# Patient Record
Sex: FEMALE | Race: WHITE | NOT HISPANIC OR LATINO | Employment: UNEMPLOYED | URBAN - METROPOLITAN AREA
[De-identification: names, ages, dates, MRNs, and addresses within clinical notes are randomized per-mention and may not be internally consistent; named-entity substitution may affect disease eponyms.]

---
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
## 2018-02-23 ENCOUNTER — OFFICE VISIT (OUTPATIENT)
Dept: INTERNAL MEDICINE | Facility: CLINIC | Age: 18
End: 2018-02-23

## 2018-02-23 VITALS
BODY MASS INDEX: 18.83 KG/M2 | HEART RATE: 75 BPM | WEIGHT: 120 LBS | OXYGEN SATURATION: 99 % | HEIGHT: 67 IN | RESPIRATION RATE: 26 BRPM | DIASTOLIC BLOOD PRESSURE: 70 MMHG | SYSTOLIC BLOOD PRESSURE: 102 MMHG

## 2018-02-23 DIAGNOSIS — R30.0 DYSURIA: ICD-10-CM

## 2018-02-23 DIAGNOSIS — N89.8 VAGINAL DISCHARGE: Primary | ICD-10-CM

## 2018-02-23 DIAGNOSIS — N75.0 BARTHOLIN'S CYST: ICD-10-CM

## 2018-02-23 LAB
BILIRUB BLD-MCNC: NEGATIVE MG/DL
CLARITY, POC: CLEAR
COLOR UR: YELLOW
GLUCOSE UR STRIP-MCNC: NEGATIVE MG/DL
KETONES UR QL: NEGATIVE
LEUKOCYTE EST, POC: ABNORMAL
NITRITE UR-MCNC: NEGATIVE MG/ML
PH UR: 5 [PH] (ref 5–8)
PROT UR STRIP-MCNC: NEGATIVE MG/DL
RBC # UR STRIP: NEGATIVE /UL
SP GR UR: 1.02 (ref 1–1.03)
UROBILINOGEN UR QL: NORMAL

## 2018-02-23 PROCEDURE — 99203 OFFICE O/P NEW LOW 30 MIN: CPT | Performed by: FAMILY MEDICINE

## 2018-02-23 PROCEDURE — 87077 CULTURE AEROBIC IDENTIFY: CPT | Performed by: FAMILY MEDICINE

## 2018-02-23 PROCEDURE — 87086 URINE CULTURE/COLONY COUNT: CPT | Performed by: FAMILY MEDICINE

## 2018-02-23 PROCEDURE — 87186 SC STD MICRODIL/AGAR DIL: CPT | Performed by: FAMILY MEDICINE

## 2018-02-23 NOTE — PATIENT INSTRUCTIONS
It was nice meeting you today!  I look forward to getting to know you and helping you with your primary care needs.  Please sign a consent to request medical records from your previous primary care provider.  We will call you with your lab results when they come back.  We have scheduled an appointment for you to see a gynecologist on 03/06/18 at 11 AM at [facility].   Recommend that you sign up for My Chart (our patient portal).  You will be able to access lab results, request appointments and send our office messages.  If you are interested, please ask for My Chart access information at the check out desk.  Please schedule an appointment for your annual physical exam (if you have not already had one for this year) at your earliest convenience.  Please follow-up as indicated.  Return to the clinic sooner if your symptoms worsen or if you have any other concerns.

## 2018-02-23 NOTE — PROGRESS NOTES
Subjective   Radha Villanueva is a 18 y.o. female who presents to the clinic to establish care and for complaint of Cyst (pelvic area) and Dysuria (burning)      History of Present Illness  She reports that her previous PCP was Afsaneh Spicer MD in [place], [place].  Transferred care due to relocating to [place] in August.  Specialists include: None  Prescription medications include: None  OTC medications include: None    Patient reports complaint of dysuria associated with malodorous urine.  Symptoms initially started in early December and have been persistent.  Was seen at an [facility] in late December and was treated for a yeast infection.  Reports that her dysuria initially improved, but then recurred about one week later.  Currently sexually active with one male partner.  Denies any history of STIs.  Also denies associated fevers, sweats, chills, flank pain, nausea, vomiting, diarrhea, hematuria, vaginal discharge, abnormal vaginal bleeding.    Also reports complaint of a recurrent Bartholin cyst on her right labia.  States that her cyst initially occurred last May and it was drained by her last PCP, which helped.  Reports recurrence of her cyst this past fall and states that it has been getting larger.    Review of Systems   Constitutional: Negative for chills, fatigue and fever.   HENT: Negative for congestion, ear pain, rhinorrhea, sinus pressure and sore throat.    Eyes: Negative for pain and visual disturbance.   Respiratory: Negative for cough and shortness of breath.    Cardiovascular: Negative for chest pain and palpitations.   Gastrointestinal: Negative for abdominal pain, constipation, diarrhea, nausea and vomiting.   Genitourinary: Positive for dysuria. Negative for decreased urine volume, hematuria, vaginal bleeding, vaginal discharge and vaginal pain.        Positive for malodorous urine.   Musculoskeletal: Negative for back pain and gait problem.   Skin: Negative for pallor, rash and wound.         "Positive for cyst on right labia.   Neurological: Negative for dizziness, syncope and headaches.   Psychiatric/Behavioral: The patient is not nervous/anxious.         Negative for depressed mood.     All other systems reviewed and are negative.    Past Medical History:   Diagnosis Date   • Bartholin gland cyst        Family History   Problem Relation Age of Onset   • No Known Problems Mother    • No Known Problems Father    • Cancer Maternal Grandmother      Unsure what kind   • No Known Problems Maternal Grandfather    • No Known Problems Paternal Grandmother    • No Known Problems Paternal Grandfather        Past Surgical History:   Procedure Laterality Date   • TONSILLECTOMY  2006       Social History     Social History   • Marital status:      Spouse name: N/A   • Number of children: N/A   • Years of education: N/A     Occupational History   • Not on file.     Social History Main Topics   • Smoking status: Never Smoker   • Smokeless tobacco: Never Used   • Alcohol use No   • Drug use: No   • Sexual activity: Yes     Partners: Male     Birth control/ protection: None     Other Topics Concern   • Not on file     Social History Narrative   • No narrative on file       No current outpatient prescriptions on file.    Objective   /70  Pulse 75  Resp 26  Ht 170.2 cm (67\")  Wt 54.4 kg (120 lb)  LMP 01/23/2018  SpO2 99%  BMI 18.79 kg/m2     Physical Exam   Constitutional: She is oriented to person, place, and time. She appears well-developed and well-nourished.   No acute distress.   HENT:   Head: Normocephalic and atraumatic.   Right Ear: External ear normal.   Left Ear: External ear normal.   Nose: Nose normal.   Mouth/Throat: Oropharynx is clear and moist.   Eyes: Conjunctivae and EOM are normal.   Neck: Normal range of motion. Neck supple.   Cardiovascular: Normal rate, regular rhythm, normal heart sounds and intact distal pulses.    Pulmonary/Chest: Effort normal and breath sounds normal. No " respiratory distress. She has no wheezes.   Abdominal: Soft. Bowel sounds are normal. There is no tenderness. There is no CVA tenderness.   Genitourinary: Cervix exhibits no motion tenderness, no discharge and no friability. No erythema or bleeding in the vagina. Vaginal discharge found.   Genitourinary Comments: Approximately 2 cm fluctuant mass noted on right labia.   Musculoskeletal: Normal range of motion.   Normal gait.   Neurological: She is alert and oriented to person, place, and time.   Skin: Skin is warm and dry.   Psychiatric: She has a normal mood and affect. Her behavior is normal.   Vitals reviewed.    Pelvic exam was chaperoned by RAFIQ Davila.    Assessment/Plan   Radha was seen today for cyst and dysuria.    Diagnoses and all orders for this visit:    Vaginal discharge    OneSwab testing was done today to test for bacterial vaginosis and yeast infections.  Will treat if lab results indicate.  Advised patient to return to the clinic if their symptoms worsen or persist despite the above.    Dysuria  -     POC Urinalysis Dipstick, Automated  -     Urine Culture - Urine, Urine, Clean Catch    The above labs were ordered today.  Discussed lab results with patient today.  Will plan to treat if urine culture results indicate.  Advised patient to return to the clinic if their symptoms worsen or persist despite the above.    Bartholin's cyst    Recommended that patient establish care with a gynecologist for further evaluation and management.

## 2018-02-25 LAB
BACTERIA SPEC AEROBE CULT: ABNORMAL
BACTERIA SPEC AEROBE CULT: ABNORMAL

## 2018-02-26 DIAGNOSIS — N39.0 ACUTE UTI: Primary | ICD-10-CM

## 2018-02-26 RX ORDER — NITROFURANTOIN 25; 75 MG/1; MG/1
100 CAPSULE ORAL 2 TIMES DAILY
Qty: 14 CAPSULE | Refills: 0 | Status: SHIPPED | OUTPATIENT
Start: 2018-02-26 | End: 2018-03-05

## 2018-11-24 ENCOUNTER — HOSPITAL ENCOUNTER (EMERGENCY)
Facility: HOSPITAL | Age: 18
Discharge: HOME | End: 2018-11-25
Attending: EMERGENCY MEDICINE | Admitting: EMERGENCY MEDICINE

## 2018-11-24 DIAGNOSIS — O20.0 THREATENED MISCARRIAGE IN EARLY PREGNANCY: Primary | ICD-10-CM

## 2018-11-24 LAB
B-HCG UR QL: POSITIVE
INTERNAL NEGATIVE CONTROL: NEGATIVE
INTERNAL POSITIVE CONTROL: POSITIVE
Lab: ABNORMAL

## 2018-11-24 PROCEDURE — 99283 EMERGENCY DEPT VISIT LOW MDM: CPT

## 2018-11-24 PROCEDURE — 81001 URINALYSIS AUTO W/SCOPE: CPT | Performed by: EMERGENCY MEDICINE

## 2018-11-24 PROCEDURE — 81025 URINE PREGNANCY TEST: CPT | Performed by: EMERGENCY MEDICINE

## 2018-11-24 PROCEDURE — 87086 URINE CULTURE/COLONY COUNT: CPT | Performed by: EMERGENCY MEDICINE

## 2018-11-24 RX ORDER — SODIUM CHLORIDE 0.9 % (FLUSH) 0.9 %
10 SYRINGE (ML) INJECTION AS NEEDED
Status: DISCONTINUED | OUTPATIENT
Start: 2018-11-24 | End: 2018-11-25 | Stop reason: HOSPADM

## 2018-11-25 ENCOUNTER — APPOINTMENT (OUTPATIENT)
Dept: ULTRASOUND IMAGING | Facility: HOSPITAL | Age: 18
End: 2018-11-25

## 2018-11-25 VITALS
HEART RATE: 84 BPM | WEIGHT: 130 LBS | SYSTOLIC BLOOD PRESSURE: 112 MMHG | DIASTOLIC BLOOD PRESSURE: 60 MMHG | BODY MASS INDEX: 20.4 KG/M2 | TEMPERATURE: 98.2 F | RESPIRATION RATE: 18 BRPM | HEIGHT: 67 IN | OXYGEN SATURATION: 99 %

## 2018-11-25 LAB
ABO GROUP BLD: NORMAL
ANION GAP SERPL CALCULATED.3IONS-SCNC: 2 MMOL/L (ref 3–11)
BACTERIA UR QL AUTO: ABNORMAL /HPF
BASOPHILS # BLD AUTO: 0.04 10*3/MM3 (ref 0–0.2)
BASOPHILS NFR BLD AUTO: 0.4 % (ref 0–1)
BILIRUB UR QL STRIP: NEGATIVE
BUN BLD-MCNC: 11 MG/DL (ref 9–23)
BUN/CREAT SERPL: 16.9 (ref 7–25)
CALCIUM SPEC-SCNC: 8.9 MG/DL (ref 8.7–10.4)
CHLORIDE SERPL-SCNC: 110 MMOL/L (ref 99–109)
CLARITY UR: CLEAR
CO2 SERPL-SCNC: 25 MMOL/L (ref 20–31)
COLOR UR: YELLOW
CREAT BLD-MCNC: 0.65 MG/DL (ref 0.6–1.3)
DEPRECATED RDW RBC AUTO: 41.7 FL (ref 37–54)
EOSINOPHIL # BLD AUTO: 0.21 10*3/MM3 (ref 0–0.3)
EOSINOPHIL NFR BLD AUTO: 1.9 % (ref 0–3)
ERYTHROCYTE [DISTWIDTH] IN BLOOD BY AUTOMATED COUNT: 12.2 % (ref 11.3–14.5)
GFR SERPL CREATININE-BSD FRML MDRD: 116 ML/MIN/1.73
GLUCOSE BLD-MCNC: 107 MG/DL (ref 70–100)
GLUCOSE UR STRIP-MCNC: NEGATIVE MG/DL
HCG INTACT+B SERPL-ACNC: 721 MIU/ML
HCT VFR BLD AUTO: 38.8 % (ref 34.5–44)
HGB BLD-MCNC: 12.6 G/DL (ref 11.5–15.5)
HGB UR QL STRIP.AUTO: ABNORMAL
HOLD SPECIMEN: NORMAL
HOLD SPECIMEN: NORMAL
HYALINE CASTS UR QL AUTO: ABNORMAL /LPF
IMM GRANULOCYTES # BLD: 0.05 10*3/MM3 (ref 0–0.03)
IMM GRANULOCYTES NFR BLD: 0.5 % (ref 0–0.6)
KETONES UR QL STRIP: NEGATIVE
LEUKOCYTE ESTERASE UR QL STRIP.AUTO: NEGATIVE
LYMPHOCYTES # BLD AUTO: 2.74 10*3/MM3 (ref 0.6–4.8)
LYMPHOCYTES NFR BLD AUTO: 25 % (ref 24–44)
MCH RBC QN AUTO: 30.4 PG (ref 27–31)
MCHC RBC AUTO-ENTMCNC: 32.5 G/DL (ref 32–36)
MCV RBC AUTO: 93.5 FL (ref 80–99)
MONOCYTES # BLD AUTO: 0.96 10*3/MM3 (ref 0–1)
MONOCYTES NFR BLD AUTO: 8.8 % (ref 0–12)
NEUTROPHILS # BLD AUTO: 7.01 10*3/MM3 (ref 1.5–8.3)
NEUTROPHILS NFR BLD AUTO: 63.9 % (ref 41–71)
NITRITE UR QL STRIP: NEGATIVE
NUMBER OF DOSES: NORMAL
PH UR STRIP.AUTO: 5.5 [PH] (ref 5–8)
PLATELET # BLD AUTO: 222 10*3/MM3 (ref 150–450)
PMV BLD AUTO: 11.4 FL (ref 6–12)
POTASSIUM BLD-SCNC: 3.8 MMOL/L (ref 3.5–5.5)
PROT UR QL STRIP: NEGATIVE
RBC # BLD AUTO: 4.15 10*6/MM3 (ref 3.89–5.14)
RBC # UR: ABNORMAL /HPF
REF LAB TEST METHOD: ABNORMAL
RH BLD: POSITIVE
SODIUM BLD-SCNC: 137 MMOL/L (ref 132–146)
SP GR UR STRIP: 1.02 (ref 1–1.03)
SQUAMOUS #/AREA URNS HPF: ABNORMAL /HPF
UROBILINOGEN UR QL STRIP: ABNORMAL
WBC NRBC COR # BLD: 10.96 10*3/MM3 (ref 4.5–13.5)
WBC UR QL AUTO: ABNORMAL /HPF
WHOLE BLOOD HOLD SPECIMEN: NORMAL
WHOLE BLOOD HOLD SPECIMEN: NORMAL

## 2018-11-25 PROCEDURE — 76817 TRANSVAGINAL US OBSTETRIC: CPT

## 2018-11-25 PROCEDURE — 85025 COMPLETE CBC W/AUTO DIFF WBC: CPT | Performed by: EMERGENCY MEDICINE

## 2018-11-25 PROCEDURE — 80048 BASIC METABOLIC PNL TOTAL CA: CPT | Performed by: EMERGENCY MEDICINE

## 2018-11-25 PROCEDURE — 86901 BLOOD TYPING SEROLOGIC RH(D): CPT | Performed by: EMERGENCY MEDICINE

## 2018-11-25 PROCEDURE — 86900 BLOOD TYPING SEROLOGIC ABO: CPT | Performed by: EMERGENCY MEDICINE

## 2018-11-25 PROCEDURE — 84702 CHORIONIC GONADOTROPIN TEST: CPT | Performed by: EMERGENCY MEDICINE

## 2018-11-25 NOTE — DISCHARGE INSTRUCTIONS
If you soak more than 1 pad per hour for 2-3 hours I recommend that you return for reevaluation.    You may take over-the-counter Tylenol and ibuprofen for discomfort.    Please follow-up with your midwife in 2 days

## 2018-11-25 NOTE — ED PROVIDER NOTES
Subjective   Radha Villanueva is a 18 y.o. female who is 6 weeks pregnant and presents to the ED with c/o vaginal bleeding with onset 2 days ago. The patient reports that the bleeding began as spotting in quality, but has worsened to heavier bleeding than typical menses today which prompted her visit to the ED. She also complains of intermittent lower abdominal cramping with onset 2 days ago which worsened significantly today to a 9/10 in severity. She took tylenol while en route to the ED with relief of her abdominal pain. There was no pain upon examination. The patient denies nausea, vomiting, vaginal discharge, or any other acute complaints at this time. She notes that she is currently taking antibiotics for a UTI.    P:0 A:0        History provided by:  Patient  Vaginal Bleeding   Quality:  Heavier than menses  Onset quality:  Gradual  Duration:  2 days  Timing:  Constant  Progression:  Worsening  Chronicity:  New  Possible pregnancy: yes    Relieved by:  None tried  Worsened by:  Nothing  Ineffective treatments:  None tried  Associated symptoms: abdominal pain (Cramping)    Associated symptoms: no fever and no nausea        Review of Systems   Constitutional: Negative for chills and fever.   Gastrointestinal: Positive for abdominal pain (Cramping). Negative for nausea and vomiting.   Genitourinary: Positive for vaginal bleeding.   All other systems reviewed and are negative.      Past Medical History:   Diagnosis Date   • Bartholin gland cyst        No Known Allergies    Past Surgical History:   Procedure Laterality Date   • TONSILLECTOMY  2006       Family History   Problem Relation Age of Onset   • No Known Problems Mother    • No Known Problems Father    • Cancer Maternal Grandmother         Unsure what kind   • No Known Problems Maternal Grandfather    • No Known Problems Paternal Grandmother    • No Known Problems Paternal Grandfather        Social History     Socioeconomic History   • Marital status:       Spouse name: Not on file   • Number of children: Not on file   • Years of education: Not on file   • Highest education level: Not on file   Tobacco Use   • Smoking status: Never Smoker   • Smokeless tobacco: Never Used   Substance and Sexual Activity   • Alcohol use: No   • Drug use: No   • Sexual activity: Yes     Partners: Male     Birth control/protection: None         Objective   Physical Exam   Constitutional: She is oriented to person, place, and time. She appears well-developed and well-nourished.  Non-toxic appearance. No distress.   Thin and pleasant female   HENT:   Head: Normocephalic and atraumatic.   Nose: Nose normal.   Eyes: Conjunctivae are normal. No scleral icterus.   Neck: Normal range of motion. Neck supple.   Cardiovascular: Normal rate, regular rhythm, normal heart sounds and intact distal pulses.   No murmur heard.  Pulmonary/Chest: Effort normal and breath sounds normal. No respiratory distress.   Abdominal: Soft. Bowel sounds are normal. There is no tenderness (Non tender to palpation throughout abdomen).   Musculoskeletal: Normal range of motion. She exhibits no edema.   Neurological: She is alert and oriented to person, place, and time.   Skin: Skin is warm and dry.   Psychiatric: She has a normal mood and affect. Her behavior is normal.   Nursing note and vitals reviewed.      Procedures         ED Course  ED Course as of Nov 26 1250   Sun Nov 25, 2018   0246 I reviewed the patient's lab work from [facility].  Her beta hCG on 11/12/18 was less than 800.  I have updated the patient and her  about our findings.  This almost certainly represents a spontaneous miscarriage.  They understand the need to return if worse and to follow-up with their midwife in 2 days for recheck to ensure beta hCG is continuing to decline.  [MS]      ED Course User Index  [MS] Lars Verde MD                     [facility]    Final diagnoses:   Threatened miscarriage in early pregnancy        Documentation assistance provided by josse Carwford.  Information recorded by the scribe was done at my direction and has been verified and validated by me.     Drew Crawford  11/25/18 0055       Lars Verde MD  11/26/18 0007

## 2018-11-27 LAB — BACTERIA SPEC AEROBE CULT: NORMAL

## 2019-04-30 ENCOUNTER — LAB (OUTPATIENT)
Dept: LAB | Facility: HOSPITAL | Age: 19
End: 2019-04-30

## 2019-04-30 DIAGNOSIS — Z32.01 PREGNANCY EXAMINATION OR TEST, POSITIVE RESULT: Primary | ICD-10-CM

## 2019-04-30 LAB
ABO GROUP BLD: NORMAL
BLD GP AB SCN SERPL QL: NEGATIVE
EXTERNAL URINE DRUG SCREEN: NEGATIVE
RH BLD: POSITIVE

## 2019-04-30 PROCEDURE — 87340 HEPATITIS B SURFACE AG IA: CPT

## 2019-04-30 PROCEDURE — 86901 BLOOD TYPING SEROLOGIC RH(D): CPT

## 2019-04-30 PROCEDURE — 85025 COMPLETE CBC W/AUTO DIFF WBC: CPT

## 2019-04-30 PROCEDURE — G0432 EIA HIV-1/HIV-2 SCREEN: HCPCS

## 2019-04-30 PROCEDURE — 84443 ASSAY THYROID STIM HORMONE: CPT

## 2019-04-30 PROCEDURE — 80081 OBSTETRIC PANEL INC HIV TSTG: CPT

## 2019-04-30 PROCEDURE — 86803 HEPATITIS C AB TEST: CPT

## 2019-04-30 PROCEDURE — 36415 COLL VENOUS BLD VENIPUNCTURE: CPT

## 2019-04-30 PROCEDURE — 86850 RBC ANTIBODY SCREEN: CPT

## 2019-04-30 PROCEDURE — 86900 BLOOD TYPING SEROLOGIC ABO: CPT

## 2019-04-30 PROCEDURE — 82947 ASSAY GLUCOSE BLOOD QUANT: CPT

## 2019-05-01 LAB
BASOPHILS # BLD AUTO: 0.07 10*3/MM3 (ref 0–0.2)
BASOPHILS NFR BLD AUTO: 0.6 % (ref 0–1.5)
DEPRECATED RDW RBC AUTO: 43.4 FL (ref 37–54)
EOSINOPHIL # BLD AUTO: 0.11 10*3/MM3 (ref 0–0.4)
EOSINOPHIL NFR BLD AUTO: 1 % (ref 0.3–6.2)
ERYTHROCYTE [DISTWIDTH] IN BLOOD BY AUTOMATED COUNT: 12.5 % (ref 12.3–15.4)
GLUCOSE BLD-MCNC: 89 MG/DL (ref 65–99)
HBV SURFACE AG SERPL QL IA: NORMAL
HCT VFR BLD AUTO: 40.7 % (ref 34–46.6)
HCV AB SER DONR QL: NORMAL
HGB BLD-MCNC: 13.2 G/DL (ref 12–15.9)
HIV1+2 AB SER QL: NORMAL
IMM GRANULOCYTES # BLD AUTO: 0.06 10*3/MM3 (ref 0–0.05)
IMM GRANULOCYTES NFR BLD AUTO: 0.5 % (ref 0–0.5)
LYMPHOCYTES # BLD AUTO: 1.38 10*3/MM3 (ref 0.7–3.1)
LYMPHOCYTES NFR BLD AUTO: 12.3 % (ref 19.6–45.3)
MCH RBC QN AUTO: 30.8 PG (ref 26.6–33)
MCHC RBC AUTO-ENTMCNC: 32.4 G/DL (ref 31.5–35.7)
MCV RBC AUTO: 95.1 FL (ref 79–97)
MONOCYTES # BLD AUTO: 1.15 10*3/MM3 (ref 0.1–0.9)
MONOCYTES NFR BLD AUTO: 10.3 % (ref 5–12)
NEUTROPHILS # BLD AUTO: 8.42 10*3/MM3 (ref 1.7–7)
NEUTROPHILS NFR BLD AUTO: 75.3 % (ref 42.7–76)
NRBC BLD AUTO-RTO: 0 /100 WBC (ref 0–0.2)
PLATELET # BLD AUTO: 184 10*3/MM3 (ref 140–450)
PMV BLD AUTO: 12.2 FL (ref 6–12)
RBC # BLD AUTO: 4.28 10*6/MM3 (ref 3.77–5.28)
RUBV IGG SERPL IA-ACNC: POSITIVE
TSH SERPL DL<=0.05 MIU/L-ACNC: 1.31 MIU/ML (ref 0.27–4.2)
WBC NRBC COR # BLD: 11.19 10*3/MM3 (ref 3.4–10.8)

## 2019-05-02 LAB — RPR SER QL: NORMAL

## 2019-05-31 ENCOUNTER — LAB (OUTPATIENT)
Dept: LAB | Facility: HOSPITAL | Age: 19
End: 2019-05-31

## 2019-05-31 ENCOUNTER — TRANSCRIBE ORDERS (OUTPATIENT)
Dept: LAB | Facility: HOSPITAL | Age: 19
End: 2019-05-31

## 2019-05-31 DIAGNOSIS — R35.0 URINARY FREQUENCY: ICD-10-CM

## 2019-05-31 DIAGNOSIS — R35.0 URINARY FREQUENCY: Primary | ICD-10-CM

## 2019-05-31 LAB
BACTERIA UR QL AUTO: ABNORMAL /HPF
BILIRUB UR QL STRIP: NEGATIVE
CLARITY UR: CLEAR
COLOR UR: YELLOW
GLUCOSE UR STRIP-MCNC: NEGATIVE MG/DL
HGB UR QL STRIP.AUTO: NEGATIVE
HYALINE CASTS UR QL AUTO: ABNORMAL /LPF
KETONES UR QL STRIP: NEGATIVE
LEUKOCYTE ESTERASE UR QL STRIP.AUTO: NEGATIVE
NITRITE UR QL STRIP: POSITIVE
PH UR STRIP.AUTO: 8.5 [PH] (ref 5–8)
PROT UR QL STRIP: NEGATIVE
RBC # UR: ABNORMAL /HPF
REF LAB TEST METHOD: ABNORMAL
SP GR UR STRIP: 1.02 (ref 1–1.03)
SQUAMOUS #/AREA URNS HPF: ABNORMAL /HPF
UROBILINOGEN UR QL STRIP: ABNORMAL
WBC UR QL AUTO: ABNORMAL /HPF

## 2019-05-31 PROCEDURE — 81001 URINALYSIS AUTO W/SCOPE: CPT

## 2019-09-17 ENCOUNTER — TRANSCRIBE ORDERS (OUTPATIENT)
Dept: LAB | Facility: HOSPITAL | Age: 19
End: 2019-09-17

## 2019-09-17 ENCOUNTER — LAB (OUTPATIENT)
Dept: LAB | Facility: HOSPITAL | Age: 19
End: 2019-09-17

## 2019-09-17 DIAGNOSIS — Z34.83 PRENATAL CARE, SUBSEQUENT PREGNANCY, THIRD TRIMESTER: ICD-10-CM

## 2019-09-17 DIAGNOSIS — Z3A.28 28 WEEKS GESTATION OF PREGNANCY: Primary | ICD-10-CM

## 2019-09-17 DIAGNOSIS — Z3A.28 28 WEEKS GESTATION OF PREGNANCY: ICD-10-CM

## 2019-09-17 LAB
BLD GP AB SCN SERPL QL: NEGATIVE
DEPRECATED RDW RBC AUTO: 44.1 FL (ref 37–54)
ERYTHROCYTE [DISTWIDTH] IN BLOOD BY AUTOMATED COUNT: 12.2 % (ref 12.3–15.4)
EXTERNAL GTT 1 HOUR: 148
GLUCOSE 1H P 100 G GLC PO SERPL-MCNC: 148 MG/DL (ref 65–140)
HCT VFR BLD AUTO: 37.3 % (ref 34–46.6)
HGB BLD-MCNC: 12.4 G/DL (ref 12–15.9)
MCH RBC QN AUTO: 32.6 PG (ref 26.6–33)
MCHC RBC AUTO-ENTMCNC: 33.2 G/DL (ref 31.5–35.7)
MCV RBC AUTO: 98.2 FL (ref 79–97)
PLATELET # BLD AUTO: 129 10*3/MM3 (ref 140–450)
PMV BLD AUTO: 12.4 FL (ref 6–12)
RBC # BLD AUTO: 3.8 10*6/MM3 (ref 3.77–5.28)
WBC NRBC COR # BLD: 12.66 10*3/MM3 (ref 3.4–10.8)

## 2019-09-17 PROCEDURE — 36415 COLL VENOUS BLD VENIPUNCTURE: CPT

## 2019-09-17 PROCEDURE — 85027 COMPLETE CBC AUTOMATED: CPT

## 2019-09-17 PROCEDURE — 86850 RBC ANTIBODY SCREEN: CPT

## 2019-09-17 PROCEDURE — 82950 GLUCOSE TEST: CPT

## 2019-09-21 ENCOUNTER — LAB (OUTPATIENT)
Dept: LAB | Facility: HOSPITAL | Age: 19
End: 2019-09-21

## 2019-09-21 DIAGNOSIS — Z3A.29 29 WEEKS GESTATION OF PREGNANCY: Primary | ICD-10-CM

## 2019-09-21 DIAGNOSIS — Z34.83 PRENATAL CARE, SUBSEQUENT PREGNANCY, THIRD TRIMESTER: ICD-10-CM

## 2019-09-21 LAB — GLUCOSE P FAST SERPL-MCNC: 97 MG/DL (ref 74–106)

## 2019-09-21 PROCEDURE — 36415 COLL VENOUS BLD VENIPUNCTURE: CPT

## 2019-09-26 ENCOUNTER — HOSPITAL ENCOUNTER (OUTPATIENT)
Facility: HOSPITAL | Age: 19
Setting detail: OBSERVATION
Discharge: HOME | End: 2019-09-26
Attending: OBSTETRICS & GYNECOLOGY | Admitting: OBSTETRICS & GYNECOLOGY

## 2019-09-26 VITALS
RESPIRATION RATE: 18 BRPM | HEART RATE: 99 BPM | SYSTOLIC BLOOD PRESSURE: 125 MMHG | WEIGHT: 153 LBS | DIASTOLIC BLOOD PRESSURE: 78 MMHG | TEMPERATURE: 98.1 F | HEIGHT: 67 IN | BODY MASS INDEX: 24.01 KG/M2

## 2019-09-26 PROBLEM — O26.892 LOW BACK PAIN DURING PREGNANCY IN SECOND TRIMESTER: Status: ACTIVE | Noted: 2019-09-26

## 2019-09-26 PROBLEM — M54.50 LOW BACK PAIN DURING PREGNANCY IN SECOND TRIMESTER: Status: ACTIVE | Noted: 2019-09-26

## 2019-09-26 PROBLEM — O23.42 UTI (URINARY TRACT INFECTION) DURING PREGNANCY, SECOND TRIMESTER: Status: ACTIVE | Noted: 2019-09-26

## 2019-09-26 LAB
BACTERIA UR QL AUTO: ABNORMAL /HPF
BILIRUB UR QL STRIP: NEGATIVE
CLARITY UR: CLEAR
COLOR UR: YELLOW
GLUCOSE UR STRIP-MCNC: NEGATIVE MG/DL
HGB UR QL STRIP.AUTO: ABNORMAL
HYALINE CASTS UR QL AUTO: ABNORMAL /LPF
KETONES UR QL STRIP: NEGATIVE
LEUKOCYTE ESTERASE UR QL STRIP.AUTO: ABNORMAL
NITRITE UR QL STRIP: POSITIVE
PH UR STRIP.AUTO: 6.5 [PH] (ref 5–8)
PROT UR QL STRIP: NEGATIVE
RBC # UR: ABNORMAL /HPF
REF LAB TEST METHOD: ABNORMAL
SP GR UR STRIP: 1.02 (ref 1–1.03)
SQUAMOUS #/AREA URNS HPF: ABNORMAL /HPF
UROBILINOGEN UR QL STRIP: ABNORMAL
WBC UR QL AUTO: ABNORMAL /HPF

## 2019-09-26 PROCEDURE — 99218 PR INITIAL OBSERVATION CARE/DAY 30 MINUTES: CPT | Performed by: OBSTETRICS & GYNECOLOGY

## 2019-09-26 PROCEDURE — 59025 FETAL NON-STRESS TEST: CPT

## 2019-09-26 PROCEDURE — 81001 URINALYSIS AUTO W/SCOPE: CPT | Performed by: OBSTETRICS & GYNECOLOGY

## 2019-09-26 PROCEDURE — G0378 HOSPITAL OBSERVATION PER HR: HCPCS

## 2019-09-26 RX ORDER — ACETAMINOPHEN 500 MG
1000 TABLET ORAL EVERY 6 HOURS PRN
COMMUNITY

## 2019-09-26 RX ORDER — SULFAMETHOXAZOLE AND TRIMETHOPRIM 400; 80 MG/1; MG/1
1 TABLET ORAL 2 TIMES DAILY
COMMUNITY
End: 2019-11-23 | Stop reason: HOSPADM

## 2019-09-26 RX ORDER — PRENATAL WITH FERROUS FUM AND FOLIC ACID 3080; 920; 120; 400; 22; 1.84; 3; 20; 10; 1; 12; 200; 27; 25; 2 [IU]/1; [IU]/1; MG/1; [IU]/1; MG/1; MG/1; MG/1; MG/1; MG/1; MG/1; UG/1; MG/1; MG/1; MG/1; MG/1
TABLET ORAL DAILY
COMMUNITY

## 2019-10-18 ENCOUNTER — TRANSCRIBE ORDERS (OUTPATIENT)
Dept: NUTRITION | Facility: HOSPITAL | Age: 19
End: 2019-10-18

## 2019-10-18 DIAGNOSIS — O26.90: Primary | ICD-10-CM

## 2019-11-06 ENCOUNTER — TRANSCRIBE ORDERS (OUTPATIENT)
Dept: DIABETES SERVICES | Facility: HOSPITAL | Age: 19
End: 2019-11-06

## 2019-11-06 DIAGNOSIS — O24.410 GESTATIONAL DIABETES MELLITUS, CLASS A1: Primary | ICD-10-CM

## 2019-11-06 DIAGNOSIS — O24.410 DIET CONTROLLED GESTATIONAL DIABETES MELLITUS (GDM), ANTEPARTUM: Primary | ICD-10-CM

## 2019-11-12 ENCOUNTER — HOSPITAL ENCOUNTER (OUTPATIENT)
Dept: DIABETES SERVICES | Facility: HOSPITAL | Age: 19
Setting detail: RECURRING SERIES
Discharge: HOME | End: 2019-11-12

## 2019-11-12 LAB — EXTERNAL GROUP B STREP ANTIGEN: NEGATIVE

## 2019-11-12 PROCEDURE — G0109 DIAB MANAGE TRN IND/GROUP: HCPCS

## 2019-11-20 ENCOUNTER — HOSPITAL ENCOUNTER (INPATIENT)
Facility: HOSPITAL | Age: 19
LOS: 3 days | Discharge: HOME | End: 2019-11-23
Attending: ADVANCED PRACTICE MIDWIFE | Admitting: OBSTETRICS & GYNECOLOGY

## 2019-11-20 ENCOUNTER — PREP FOR SURGERY (OUTPATIENT)
Dept: OTHER | Facility: HOSPITAL | Age: 19
End: 2019-11-20

## 2019-11-20 DIAGNOSIS — Z3A.38 38 WEEKS GESTATION OF PREGNANCY: Primary | ICD-10-CM

## 2019-11-20 DIAGNOSIS — O36.8130 DECREASED FETAL MOVEMENT AFFECTING MANAGEMENT OF PREGNANCY IN THIRD TRIMESTER, SINGLE OR UNSPECIFIED FETUS: ICD-10-CM

## 2019-11-20 DIAGNOSIS — Z3A.38 38 WEEKS GESTATION OF PREGNANCY: ICD-10-CM

## 2019-11-20 LAB
ABO GROUP BLD: NORMAL
BLD GP AB SCN SERPL QL: NEGATIVE
DEPRECATED RDW RBC AUTO: 43.4 FL (ref 37–54)
ERYTHROCYTE [DISTWIDTH] IN BLOOD BY AUTOMATED COUNT: 11.9 % (ref 12.3–15.4)
HCT VFR BLD AUTO: 40.5 % (ref 34–46.6)
HGB BLD-MCNC: 13.4 G/DL (ref 12–15.9)
MCH RBC QN AUTO: 32.2 PG (ref 26.6–33)
MCHC RBC AUTO-ENTMCNC: 33.1 G/DL (ref 31.5–35.7)
MCV RBC AUTO: 97.4 FL (ref 79–97)
PLATELET # BLD AUTO: 133 10*3/MM3 (ref 140–450)
PMV BLD AUTO: 13.3 FL (ref 6–12)
RBC # BLD AUTO: 4.16 10*6/MM3 (ref 3.77–5.28)
RH BLD: POSITIVE
T&S EXPIRATION DATE: NORMAL
WBC NRBC COR # BLD: 12.81 10*3/MM3 (ref 3.4–10.8)

## 2019-11-20 PROCEDURE — 86850 RBC ANTIBODY SCREEN: CPT | Performed by: ADVANCED PRACTICE MIDWIFE

## 2019-11-20 PROCEDURE — 86901 BLOOD TYPING SEROLOGIC RH(D): CPT | Performed by: ADVANCED PRACTICE MIDWIFE

## 2019-11-20 PROCEDURE — 86900 BLOOD TYPING SEROLOGIC ABO: CPT | Performed by: ADVANCED PRACTICE MIDWIFE

## 2019-11-20 PROCEDURE — 59025 FETAL NON-STRESS TEST: CPT

## 2019-11-20 PROCEDURE — 85027 COMPLETE CBC AUTOMATED: CPT | Performed by: ADVANCED PRACTICE MIDWIFE

## 2019-11-20 RX ORDER — BUTORPHANOL TARTRATE 1 MG/ML
1 INJECTION, SOLUTION INTRAMUSCULAR; INTRAVENOUS
Status: DISCONTINUED | OUTPATIENT
Start: 2019-11-20 | End: 2019-11-21 | Stop reason: HOSPADM

## 2019-11-20 RX ORDER — OXYTOCIN-SODIUM CHLORIDE 0.9% IV SOLN 30 UNIT/500ML 30-0.9/5 UT/ML-%
85 SOLUTION INTRAVENOUS ONCE
Status: CANCELLED | OUTPATIENT
Start: 2019-11-20

## 2019-11-20 RX ORDER — BUTORPHANOL TARTRATE 1 MG/ML
1 INJECTION, SOLUTION INTRAMUSCULAR; INTRAVENOUS
Status: CANCELLED | OUTPATIENT
Start: 2019-11-20

## 2019-11-20 RX ORDER — CARBOPROST TROMETHAMINE 250 UG/ML
250 INJECTION, SOLUTION INTRAMUSCULAR AS NEEDED
Status: CANCELLED | OUTPATIENT
Start: 2019-11-20

## 2019-11-20 RX ORDER — SODIUM CHLORIDE 0.9 % (FLUSH) 0.9 %
3 SYRINGE (ML) INJECTION EVERY 12 HOURS SCHEDULED
Status: CANCELLED | OUTPATIENT
Start: 2019-11-20

## 2019-11-20 RX ORDER — OXYTOCIN-SODIUM CHLORIDE 0.9% IV SOLN 30 UNIT/500ML 30-0.9/5 UT/ML-%
2-30 SOLUTION INTRAVENOUS
Status: DISCONTINUED | OUTPATIENT
Start: 2019-11-20 | End: 2019-11-21 | Stop reason: HOSPADM

## 2019-11-20 RX ORDER — SODIUM CHLORIDE 0.9 % (FLUSH) 0.9 %
1-10 SYRINGE (ML) INJECTION AS NEEDED
Status: CANCELLED | OUTPATIENT
Start: 2019-11-20

## 2019-11-20 RX ORDER — OXYTOCIN-SODIUM CHLORIDE 0.9% IV SOLN 30 UNIT/500ML 30-0.9/5 UT/ML-%
2-30 SOLUTION INTRAVENOUS
Status: CANCELLED | OUTPATIENT
Start: 2019-11-20

## 2019-11-20 RX ORDER — LIDOCAINE HYDROCHLORIDE 10 MG/ML
5 INJECTION, SOLUTION EPIDURAL; INFILTRATION; INTRACAUDAL; PERINEURAL AS NEEDED
Status: CANCELLED | OUTPATIENT
Start: 2019-11-20

## 2019-11-20 RX ORDER — IBUPROFEN 600 MG/1
600 TABLET ORAL EVERY 6 HOURS PRN
Status: CANCELLED | OUTPATIENT
Start: 2019-11-20

## 2019-11-20 RX ORDER — MAGNESIUM CARB/ALUMINUM HYDROX 105-160MG
30 TABLET,CHEWABLE ORAL ONCE
Status: CANCELLED | OUTPATIENT
Start: 2019-11-20 | End: 2019-11-20

## 2019-11-20 RX ORDER — MISOPROSTOL 200 UG/1
800 TABLET ORAL AS NEEDED
Status: CANCELLED | OUTPATIENT
Start: 2019-11-20

## 2019-11-20 RX ORDER — NITROFURANTOIN 25; 75 MG/1; MG/1
100 CAPSULE ORAL 2 TIMES DAILY
COMMUNITY
End: 2021-11-20 | Stop reason: HOSPADM

## 2019-11-20 RX ORDER — LIDOCAINE HYDROCHLORIDE 10 MG/ML
5 INJECTION, SOLUTION EPIDURAL; INFILTRATION; INTRACAUDAL; PERINEURAL AS NEEDED
Status: DISCONTINUED | OUTPATIENT
Start: 2019-11-20 | End: 2019-11-21 | Stop reason: HOSPADM

## 2019-11-20 RX ORDER — MAGNESIUM CARB/ALUMINUM HYDROX 105-160MG
30 TABLET,CHEWABLE ORAL ONCE
Status: DISCONTINUED | OUTPATIENT
Start: 2019-11-20 | End: 2019-11-21 | Stop reason: HOSPADM

## 2019-11-20 RX ORDER — SODIUM CHLORIDE 0.9 % (FLUSH) 0.9 %
1-10 SYRINGE (ML) INJECTION AS NEEDED
Status: DISCONTINUED | OUTPATIENT
Start: 2019-11-20 | End: 2019-11-21 | Stop reason: HOSPADM

## 2019-11-20 RX ORDER — METHYLERGONOVINE MALEATE 0.2 MG/ML
200 INJECTION INTRAVENOUS ONCE AS NEEDED
Status: CANCELLED | OUTPATIENT
Start: 2019-11-20

## 2019-11-20 RX ORDER — SODIUM CHLORIDE 0.9 % (FLUSH) 0.9 %
3 SYRINGE (ML) INJECTION EVERY 12 HOURS SCHEDULED
Status: DISCONTINUED | OUTPATIENT
Start: 2019-11-20 | End: 2019-11-21 | Stop reason: HOSPADM

## 2019-11-20 RX ORDER — SODIUM CHLORIDE, SODIUM LACTATE, POTASSIUM CHLORIDE, CALCIUM CHLORIDE 600; 310; 30; 20 MG/100ML; MG/100ML; MG/100ML; MG/100ML
125 INJECTION, SOLUTION INTRAVENOUS CONTINUOUS
Status: DISCONTINUED | OUTPATIENT
Start: 2019-11-21 | End: 2019-11-21

## 2019-11-20 RX ORDER — OXYTOCIN-SODIUM CHLORIDE 0.9% IV SOLN 30 UNIT/500ML 30-0.9/5 UT/ML-%
650 SOLUTION INTRAVENOUS ONCE
Status: CANCELLED | OUTPATIENT
Start: 2019-11-20

## 2019-11-20 RX ORDER — SODIUM CHLORIDE, SODIUM LACTATE, POTASSIUM CHLORIDE, CALCIUM CHLORIDE 600; 310; 30; 20 MG/100ML; MG/100ML; MG/100ML; MG/100ML
125 INJECTION, SOLUTION INTRAVENOUS CONTINUOUS
Status: DISCONTINUED | OUTPATIENT
Start: 2019-11-20 | End: 2019-11-20

## 2019-11-20 RX ORDER — SODIUM CHLORIDE, SODIUM LACTATE, POTASSIUM CHLORIDE, CALCIUM CHLORIDE 600; 310; 30; 20 MG/100ML; MG/100ML; MG/100ML; MG/100ML
125 INJECTION, SOLUTION INTRAVENOUS CONTINUOUS
Status: CANCELLED | OUTPATIENT
Start: 2019-11-20

## 2019-11-20 RX ORDER — OXYTOCIN-SODIUM CHLORIDE 0.9% IV SOLN 30 UNIT/500ML 30-0.9/5 UT/ML-%
2-30 SOLUTION INTRAVENOUS
Status: DISCONTINUED | OUTPATIENT
Start: 2019-11-20 | End: 2019-11-20

## 2019-11-20 RX ORDER — OXYTOCIN-SODIUM CHLORIDE 0.9% IV SOLN 30 UNIT/500ML 30-0.9/5 UT/ML-%
2-30 SOLUTION INTRAVENOUS
Status: DISCONTINUED | OUTPATIENT
Start: 2019-11-21 | End: 2019-11-20

## 2019-11-20 RX ADMIN — SODIUM CHLORIDE, POTASSIUM CHLORIDE, SODIUM LACTATE AND CALCIUM CHLORIDE 125 ML/HR: 600; 310; 30; 20 INJECTION, SOLUTION INTRAVENOUS at 13:35

## 2019-11-20 RX ADMIN — OXYTOCIN 2 MILLI-UNITS/MIN: 10 INJECTION, SOLUTION INTRAMUSCULAR; INTRAVENOUS at 14:45

## 2019-11-20 RX ADMIN — OXYTOCIN 2 MILLI-UNITS/MIN: 10 INJECTION INTRAVENOUS at 22:02

## 2019-11-20 NOTE — H&P
Obstetric History and Physical    Chief complaint: decreased fetal movement 38 weeks 2 days  Subjective     Patient is a 19 y.o. female  currently at 38w2d, who presents for routine prenatal visit with reports of not feeling fetal movement this morning. Occasional contraction, no vaginal bleeding or leaking fluid..    Her prenatal care is benign but failed 1 hour GCT and vomited 3 hr GTT.  She has been checking BS at home that have all been normal..  Her previous obstetric/gynecological history is non-contributory.    The following portions of the patients history were reviewed and updated as appropriate: current medications .       Prenatal Information:   Maternal Prenatal Labs  Blood Type ABO Type   Date Value Ref Range Status   2019 O  Final      Rh Status RH type   Date Value Ref Range Status   2019 Positive  Final      Antibody Screen Antibody Screen   Date Value Ref Range Status   2019 Negative  Final      Gonnorhea No results found for: GCCX   Chlamydia No results found for: CLAMYDCU   RPR RPR   Date Value Ref Range Status   2019 Non-Reactive Non-Reactive Final      Syphilis Antibody No results found for: SYPHILIS   Rubella No results found for: RUBELLAIGGIN   Hepatitis B Surface Antigen Hepatitis B Surface Ag   Date Value Ref Range Status   2019 Non-Reactive Non-Reactive Final                              External Prenatal Results     Pregnancy Outside Results - Transcribed From Office Records - See Scanned Records For Details     Test Value Date Time    Hgb 12.4 g/dL 19 0958    Hct 37.3 % 19 0958    ABO O  19 1641    Rh Positive  19 1641    Antibody Screen Negative  19 0958    Glucose Fasting GTT       Glucose Tolerance Test 1 hour       Glucose Tolerance Test 3 hour       Gonorrhea (discrete)       Chlamydia (discrete)       RPR Non-Reactive  19 1641    VDRL       Syphilis Antibody       Rubella Positive  19 1641    HBsAg  Non-Reactive  19 1641    Herpes Simplex Virus PCR       Herpes Simplex VIrus Culture       HIV Non-Reactive  19 1641    Hep C RNA Quant PCR       Hep C Antibody Non-Reactive  19 1641    AFP       Group B Strep Negative        GBS Susceptibility to Clindamycin       GBS Susceptibility to Erythromycin       Fetal Fibronectin       Genetic Testing, Maternal Blood             Drug Screening     Test Value Date Time    Urine Drug Screen       Amphetamine Screen       Barbiturate Screen       Benzodiazepine Screen       Methadone Screen       Phencyclidine Screen       Opiates Screen       THC Screen       Cocaine Screen       Propoxyphene Screen       Buprenorphine Screen       Methamphetamine Screen       Oxycodone Screen       Tricyclic Antidepressants Screen                     Past OB History:       Obstetric History       T0      L0     SAB0   TAB0   Ectopic0   Molar0   Multiple0   Live Births0       # Outcome Date GA Lbr Wilian/2nd Weight Sex Delivery Anes PTL Lv   1 Current                   Past Medical History: Past Medical History:   Diagnosis Date   • Bartholin gland cyst    • Urinary tract infection       Past Surgical History Past Surgical History:   Procedure Laterality Date   • TONSILLECTOMY     • WISDOM TOOTH EXTRACTION        Family History: Family History   Problem Relation Age of Onset   • No Known Problems Mother    • No Known Problems Father    • Cancer Maternal Grandmother         Unsure what kind   • No Known Problems Maternal Grandfather    • No Known Problems Paternal Grandmother    • No Known Problems Paternal Grandfather       Social History:  reports that she has never smoked. She has never used smokeless tobacco.   reports that she does not drink alcohol.   reports that she does not use drugs.        General ROS: Pertinent items are noted in HPI, all other systems reviewed and negative    Objective     There were no vitals filed for this visit.  Weight:        Physical Examination:   General Appearance: alert, well appearing, in no apparent distress  Lungs: clear to auscultation, no wheezes, rales or rhonchi, symmetric air entry  Heart: regular rate and rhythm, no murmurs  Abdomen: Gravid uterus, non tender, FHT present  Back exam: no CVA tenderness   Extremities: no redness or tenderness in the calves or thighs, no edema  Skin: normal coloration and turgor, no rashes      Presentation: Vertex   Cervix: Exam by:  Elizabeth Yanez CNM   Dilation:  2-3 cm   Effacement:  80%   Station:  -1        Fetal Heart Rate Assessment   Method:  EFM   Beats/min:     Baseline:  125   Varibility:  decreased   Accels: absent   Decels:  absent   Tracing Category:       Uterine Assessment   Method:  palpation   Frequency (min):  occasional   Ctx Count in 10 min:     Duration:     Intensity:  mild   Intensity by IUPC:     Resting Tone:     Resting Tone by IUPC:     [place] Units:       Laboratory Results: Pending        * No active hospital problems. *        Assessment:  1.  Intrauterine pregnancy at 38w2d weeks gestation with nonreactive fetal status.    2.  labor  without ROM  3.  Obstetrical history is non-contributory.  4.  GBS status: Negative  Plan:  1. fetal and uterine monitoring  continuously, labor augmentation  Pitocin and analgesia with  parenteral narcotics and epidural  2. Plan of care has been reviewed with patient.  3.  Risks, benefits of treatment plan have been discussed.  4.  All questions have been answered.      Elizabeth Yanez CNM  11/20/2019  12:50 PM

## 2019-11-20 NOTE — PROGRESS NOTES
"11/20/19  5:33 PM  Radha Villanueva       ASSESSMENTS:Starting to feel contractions.  Not uncomfortable now.  Baby moving a lot more now.     /72   Pulse 77   Temp 98.3 °F (36.8 °C) (Oral)   Resp 16   Ht 170.2 cm (67\")   Wt 73.9 kg (163 lb)   Breastfeeding? Yes   BMI 25.53 kg/m²     Fetal Heart Rate Assessment   Method: Fetal HR Assessment Method: external   Beats/min: Fetal HR (beats/min): 125   Baseline: Fetal Heart Baseline Rate: normal range   Varibility: Fetal HR Variability: moderate (amplitude range 6 to 25 bpm)   Accels: Fetal HR Accelerations: greater than/equal to 15 bpm, lasting at least 15 seconds   Decels: Fetal HR Decelerations: absent   Tracing Category:  Cat 1     Uterine Assessment   Method: Method: external tocotransducer, palpation   Frequency (min): Contraction Frequency (Minutes): 3-3.5   Ctx Count in 10 min:     Duration:     Intensity: Contraction Intensity: moderate by palpation   Intensity by IUPC:     Resting Tone: Uterine Resting Tone: soft by palpation   Resting Tone by IUPC:     [place] Units:                                Presentation: Vertex   Cervix: Exam by: Method: sterile exam per CNM   Dilation: Cervical Dilation (cm): 3   Effacement: Cervical Effacement: 80%   Station: Fetal Station: -1            Lab Results   Component Value Date    WBC 12.81 (H) 11/20/2019    HGB 13.4 11/20/2019    HCT 40.5 11/20/2019    MCV 97.4 (H) 11/20/2019     (L) 11/20/2019     Results from last 7 days   Lab Units 11/20/19  1340   ABO TYPING  O   RH TYPING  Positive   ANTIBODY SCREEN  Negative       PLAN:Discussed options for management.  1) Continue present management with pitocin per protocol with or without amniotomy. 2) Stop pitocin.  Allow to shower and regular diet and ambulate, restart low dose pitocin at MN and per protocol at 0500.   She initially wanted continue pitocin with amniotomy. When I was unable to break her water she wanted to further discuss options with " .    Elizabeth Yanez CNM  5:33 PM  11/20/19

## 2019-11-20 NOTE — PLAN OF CARE
Problem: Patient Care Overview  Goal: Plan of Care Review  Outcome: Ongoing (interventions implemented as appropriate)   11/20/19 1416   Coping/Psychosocial   Plan of Care Reviewed With patient;mother   Plan of Care Review   Progress improving     Goal: Individualization and Mutuality  Outcome: Ongoing (interventions implemented as appropriate)   11/20/19 1416   Individualization   Patient Specific Preferences epidural, breastfeeding   Patient Specific Goals (Include Timeframe) have happy healthy baby girl   Patient Specific Interventions pit induction for DFM, BPP 6/10     Goal: Discharge Needs Assessment  Outcome: Ongoing (interventions implemented as appropriate)   11/20/19 1416   Discharge Needs Assessment   Concerns to be Addressed denies needs/concerns at this time       Problem: Labor (Cervical Ripen, Induct, Augment) (Adult,Obstetrics,Pediatric)  Intervention: Monitor/Manage Labor Dystocia   11/20/19 1416   Nutrition Interventions   Fluid/Electrolyte Management fluids provided     Intervention: Monitor/Manage Labor Pain   11/20/19 1416   Promote Oxygenation/Perfusion   Pain Management Interventions pain management plan reviewed with patient/caregiver       Goal: Signs and Symptoms of Listed Potential Problems Will be Absent, Minimized or Managed (Labor)  Outcome: Ongoing (interventions implemented as appropriate)   11/20/19 1416   Goal/Outcome Evaluation   Problems Assessed (Labor) all   Problems Present (Labor) none

## 2019-11-21 ENCOUNTER — ANESTHESIA (OUTPATIENT)
Dept: LABOR AND DELIVERY | Facility: HOSPITAL | Age: 19
End: 2019-11-21

## 2019-11-21 ENCOUNTER — ANESTHESIA EVENT (OUTPATIENT)
Dept: LABOR AND DELIVERY | Facility: HOSPITAL | Age: 19
End: 2019-11-21

## 2019-11-21 PROBLEM — Z3A.38 38 WEEKS GESTATION OF PREGNANCY: Status: RESOLVED | Noted: 2019-11-20 | Resolved: 2019-11-21

## 2019-11-21 PROBLEM — O26.892 LOW BACK PAIN DURING PREGNANCY IN SECOND TRIMESTER: Status: RESOLVED | Noted: 2019-09-26 | Resolved: 2019-11-21

## 2019-11-21 PROBLEM — M54.50 LOW BACK PAIN DURING PREGNANCY IN SECOND TRIMESTER: Status: RESOLVED | Noted: 2019-09-26 | Resolved: 2019-11-21

## 2019-11-21 PROBLEM — N75.0 BARTHOLIN'S CYST: Status: RESOLVED | Noted: 2018-02-23 | Resolved: 2019-11-21

## 2019-11-21 PROCEDURE — 25010000002 FENTANYL CITRATE (PF) 100 MCG/2ML SOLUTION: Performed by: NURSE ANESTHETIST, CERTIFIED REGISTERED

## 2019-11-21 PROCEDURE — 25010000002 ROPIVACAINE PER 1 MG: Performed by: NURSE ANESTHETIST, CERTIFIED REGISTERED

## 2019-11-21 PROCEDURE — 59025 FETAL NON-STRESS TEST: CPT

## 2019-11-21 PROCEDURE — C1755 CATHETER, INTRASPINAL: HCPCS | Performed by: ANESTHESIOLOGY

## 2019-11-21 PROCEDURE — 10907ZC DRAINAGE OF AMNIOTIC FLUID, THERAPEUTIC FROM PRODUCTS OF CONCEPTION, VIA NATURAL OR ARTIFICIAL OPENING: ICD-10-PCS | Performed by: OBSTETRICS & GYNECOLOGY

## 2019-11-21 PROCEDURE — C1755 CATHETER, INTRASPINAL: HCPCS

## 2019-11-21 PROCEDURE — 51703 INSERT BLADDER CATH COMPLEX: CPT

## 2019-11-21 RX ORDER — ACETAMINOPHEN 325 MG/1
650 TABLET ORAL EVERY 4 HOURS PRN
Status: DISCONTINUED | OUTPATIENT
Start: 2019-11-21 | End: 2019-11-23 | Stop reason: HOSPADM

## 2019-11-21 RX ORDER — IBUPROFEN 600 MG/1
600 TABLET ORAL EVERY 6 HOURS PRN
Status: DISCONTINUED | OUTPATIENT
Start: 2019-11-21 | End: 2019-11-21 | Stop reason: HOSPADM

## 2019-11-21 RX ORDER — OXYTOCIN-SODIUM CHLORIDE 0.9% IV SOLN 30 UNIT/500ML 30-0.9/5 UT/ML-%
650 SOLUTION INTRAVENOUS ONCE
Status: COMPLETED | OUTPATIENT
Start: 2019-11-21 | End: 2019-11-21

## 2019-11-21 RX ORDER — DIPHENHYDRAMINE HYDROCHLORIDE 50 MG/ML
12.5 INJECTION INTRAMUSCULAR; INTRAVENOUS EVERY 8 HOURS PRN
Status: DISCONTINUED | OUTPATIENT
Start: 2019-11-21 | End: 2019-11-21 | Stop reason: HOSPADM

## 2019-11-21 RX ORDER — PRENATAL VIT/IRON FUM/FOLIC AC 27MG-0.8MG
1 TABLET ORAL DAILY
Status: DISCONTINUED | OUTPATIENT
Start: 2019-11-21 | End: 2019-11-23 | Stop reason: HOSPADM

## 2019-11-21 RX ORDER — DOCUSATE SODIUM 100 MG/1
100 CAPSULE, LIQUID FILLED ORAL 2 TIMES DAILY
Status: DISCONTINUED | OUTPATIENT
Start: 2019-11-21 | End: 2019-11-23 | Stop reason: HOSPADM

## 2019-11-21 RX ORDER — IBUPROFEN 600 MG/1
600 TABLET ORAL EVERY 6 HOURS PRN
Status: DISCONTINUED | OUTPATIENT
Start: 2019-11-21 | End: 2019-11-23 | Stop reason: HOSPADM

## 2019-11-21 RX ORDER — METHYLERGONOVINE MALEATE 0.2 MG/ML
200 INJECTION INTRAVENOUS ONCE AS NEEDED
Status: DISCONTINUED | OUTPATIENT
Start: 2019-11-21 | End: 2019-11-21 | Stop reason: HOSPADM

## 2019-11-21 RX ORDER — LIDOCAINE HYDROCHLORIDE AND EPINEPHRINE 15; 5 MG/ML; UG/ML
INJECTION, SOLUTION EPIDURAL AS NEEDED
Status: DISCONTINUED | OUTPATIENT
Start: 2019-11-21 | End: 2019-11-21 | Stop reason: SURG

## 2019-11-21 RX ORDER — ONDANSETRON 2 MG/ML
4 INJECTION INTRAMUSCULAR; INTRAVENOUS ONCE AS NEEDED
Status: DISCONTINUED | OUTPATIENT
Start: 2019-11-21 | End: 2019-11-21 | Stop reason: HOSPADM

## 2019-11-21 RX ORDER — IBUPROFEN 600 MG/1
600 TABLET ORAL EVERY 6 HOURS PRN
Status: DISCONTINUED | OUTPATIENT
Start: 2019-11-21 | End: 2019-11-21

## 2019-11-21 RX ORDER — CARBOPROST TROMETHAMINE 250 UG/ML
250 INJECTION, SOLUTION INTRAMUSCULAR AS NEEDED
Status: DISCONTINUED | OUTPATIENT
Start: 2019-11-21 | End: 2019-11-21 | Stop reason: HOSPADM

## 2019-11-21 RX ORDER — FENTANYL CITRATE 50 UG/ML
INJECTION, SOLUTION INTRAMUSCULAR; INTRAVENOUS AS NEEDED
Status: DISCONTINUED | OUTPATIENT
Start: 2019-11-21 | End: 2019-11-21 | Stop reason: SURG

## 2019-11-21 RX ORDER — LANOLIN
CREAM (ML) TOPICAL
Status: DISCONTINUED | OUTPATIENT
Start: 2019-11-21 | End: 2019-11-23 | Stop reason: HOSPADM

## 2019-11-21 RX ORDER — TRISODIUM CITRATE DIHYDRATE AND CITRIC ACID MONOHYDRATE 500; 334 MG/5ML; MG/5ML
30 SOLUTION ORAL ONCE
Status: DISCONTINUED | OUTPATIENT
Start: 2019-11-21 | End: 2019-11-21 | Stop reason: HOSPADM

## 2019-11-21 RX ORDER — FAMOTIDINE 10 MG/ML
20 INJECTION, SOLUTION INTRAVENOUS ONCE AS NEEDED
Status: DISCONTINUED | OUTPATIENT
Start: 2019-11-21 | End: 2019-11-21 | Stop reason: HOSPADM

## 2019-11-21 RX ORDER — OXYTOCIN-SODIUM CHLORIDE 0.9% IV SOLN 30 UNIT/500ML 30-0.9/5 UT/ML-%
85 SOLUTION INTRAVENOUS ONCE
Status: COMPLETED | OUTPATIENT
Start: 2019-11-21 | End: 2019-11-21

## 2019-11-21 RX ORDER — EPHEDRINE SULFATE/0.9% NACL/PF 25 MG/5 ML
10 SYRINGE (ML) INTRAVENOUS
Status: DISCONTINUED | OUTPATIENT
Start: 2019-11-21 | End: 2019-11-21 | Stop reason: HOSPADM

## 2019-11-21 RX ORDER — METOCLOPRAMIDE HYDROCHLORIDE 5 MG/ML
10 INJECTION INTRAMUSCULAR; INTRAVENOUS ONCE AS NEEDED
Status: DISCONTINUED | OUTPATIENT
Start: 2019-11-21 | End: 2019-11-21 | Stop reason: HOSPADM

## 2019-11-21 RX ORDER — MISOPROSTOL 200 UG/1
800 TABLET ORAL AS NEEDED
Status: DISCONTINUED | OUTPATIENT
Start: 2019-11-21 | End: 2019-11-21 | Stop reason: HOSPADM

## 2019-11-21 RX ADMIN — OXYTOCIN 650 ML/HR: 10 INJECTION, SOLUTION INTRAMUSCULAR; INTRAVENOUS at 11:45

## 2019-11-21 RX ADMIN — IBUPROFEN 600 MG: 600 TABLET, FILM COATED ORAL at 16:02

## 2019-11-21 RX ADMIN — SODIUM CHLORIDE, POTASSIUM CHLORIDE, SODIUM LACTATE AND CALCIUM CHLORIDE 1000 ML: 600; 310; 30; 20 INJECTION, SOLUTION INTRAVENOUS at 07:52

## 2019-11-21 RX ADMIN — SODIUM CHLORIDE, POTASSIUM CHLORIDE, SODIUM LACTATE AND CALCIUM CHLORIDE 125 ML/HR: 600; 310; 30; 20 INJECTION, SOLUTION INTRAVENOUS at 08:47

## 2019-11-21 RX ADMIN — ROPIVACAINE HYDROCHLORIDE 10 ML: 5 INJECTION, SOLUTION EPIDURAL; INFILTRATION; PERINEURAL at 08:09

## 2019-11-21 RX ADMIN — Medication 10 MG: at 08:38

## 2019-11-21 RX ADMIN — SODIUM CHLORIDE, POTASSIUM CHLORIDE, SODIUM LACTATE AND CALCIUM CHLORIDE 125 ML/HR: 600; 310; 30; 20 INJECTION, SOLUTION INTRAVENOUS at 01:50

## 2019-11-21 RX ADMIN — LIDOCAINE HYDROCHLORIDE AND EPINEPHRINE 2 ML: 15; 5 INJECTION, SOLUTION EPIDURAL at 08:07

## 2019-11-21 RX ADMIN — OXYTOCIN 85 ML/HR: 10 INJECTION, SOLUTION INTRAMUSCULAR; INTRAVENOUS at 12:02

## 2019-11-21 RX ADMIN — SODIUM CHLORIDE, POTASSIUM CHLORIDE, SODIUM LACTATE AND CALCIUM CHLORIDE 1000 ML: 600; 310; 30; 20 INJECTION, SOLUTION INTRAVENOUS at 07:32

## 2019-11-21 RX ADMIN — IBUPROFEN 600 MG: 600 TABLET, FILM COATED ORAL at 21:46

## 2019-11-21 RX ADMIN — FENTANYL CITRATE 100 MCG: 50 INJECTION, SOLUTION INTRAMUSCULAR; INTRAVENOUS at 08:07

## 2019-11-21 RX ADMIN — Medication 14 ML/HR: at 08:12

## 2019-11-21 RX ADMIN — LIDOCAINE HYDROCHLORIDE AND EPINEPHRINE 3 ML: 15; 5 INJECTION, SOLUTION EPIDURAL at 08:04

## 2019-11-21 NOTE — L&D DELIVERY NOTE
[facility]  Vaginal Delivery Note    Delivery     Delivery: Vaginal, Spontaneous     YOB: 2021    Time of Birth:  Gestational Age 11:38 AM   38w3d     Anesthesia: Epidural     Delivering clinician: Elizabeth Yanez CNM   Forceps?   No   Vacuum? No    Shoulder dystocia present: No        Delivery narrative:  Radha pushed well to a spontaneous delivery of a viable female infant over intact perineum. Mouth and nose bulb suctioned after delivery of the head. Loop of cord was noted by left side of neck. Shoulders and body delivered spontaneously.  Infant placed on mothers abdomen and dried.  Good cry.  Cord was allowed to stop pulsation. Cord was double clamped, cut by FOB. Cord blood and cord segment were obtained.  Spontaneous delivery of intact placenta with 3 vessel cord. Fundus firm.Lochia light rubra.  Perineal inspection revealed superficial abrasions with hemostasis.  Mother and daughter stable in the immediate PP period.    Infant    Findings: female  infant     Infant observations: Weight: 2860 g (6 lb 4.9 oz)   Length: 19.5  in  Observations/Comments:         Apgars:   9 @ 1 minute /      9 @ 5 minutes   Infant Name: Sabra Medina     Placenta, Cord, and Fluid    Placenta delivered  Spontaneous  at   11/21/2019 11:43 AM     Cord: 3 vessels  present.   Nuchal Cord?  no   Cord blood obtained: Yes    Cord gases obtained:  No      Repair    Episiotomy: None     No    Lacerations: No   Estimated Blood Loss: Est. Blood Loss (mL): 100 mL(Filed from Delivery Summary) (11/21/19 6036)           Complications  none    Disposition  Mother to Mother Baby/Postpartum  in stable condition currently.  Baby to remains with mom  in stable condition currently.      Elizabeth Yanez CNM  11/21/19  12:05 PM

## 2019-11-21 NOTE — ANESTHESIA PROCEDURE NOTES
Labor Epidural      Patient reassessed immediately prior to procedure    Patient location during procedure: OB  Performed By  CRNA: Sandy Edgar CRNA  Preanesthetic Checklist  Completed: patient identified, surgical consent, pre-op evaluation, timeout performed, IV checked, risks and benefits discussed and monitors and equipment checked  Prep:  Pt Position:sitting  Sterile Tech:cap, gloves, mask and sterile barrier  Prep:DuraPrep  Monitoring:blood pressure monitoring  Epidural Block Procedure:  Approach:midline  Guidance:palpation technique  Location:L3-L4  Needle Type:Tuohy  Needle Gauge:17 G  Loss of Resistance Medium: saline  Loss of Resistance: 6cm  Cath Depth at skin:10 cm  Paresthesia: left and transient  Aspiration:negative  Test Dose:negative  Number of Attempts: 2  Post Assessment:  Dressing:occlusive dressing applied and secured with tape  Pt Tolerance:patient tolerated the procedure well with no apparent complications  Complications:no

## 2019-11-21 NOTE — PLAN OF CARE
Problem: Labor (Cervical Ripen, Induct, Augment) (Adult,Obstetrics,Pediatric)  Goal: Signs and Symptoms of Listed Potential Problems Will be Absent, Minimized or Managed (Labor)  Outcome: Outcome(s) achieved Date Met: 11/21/19 11/21/19 1618   Goal/Outcome Evaluation   Problems Assessed (Labor) all   Problems Present (Labor) none

## 2019-11-21 NOTE — PLAN OF CARE
Problem: Patient Care Overview  Goal: Plan of Care Review  Outcome: Ongoing (interventions implemented as appropriate)   11/21/19 0818   Coping/Psychosocial   Plan of Care Reviewed With patient;spouse   Plan of Care Review   Progress improving   OTHER   Outcome Summary epidural placed      Goal: Individualization and Mutuality  Outcome: Ongoing (interventions implemented as appropriate)   11/20/19 1416   Individualization   Patient Specific Preferences epidural, breastfeeding   Patient Specific Goals (Include Timeframe) have happy healthy baby girl       Problem: Labor (Cervical Ripen, Induct, Augment) (Adult,Obstetrics,Pediatric)  Goal: Signs and Symptoms of Listed Potential Problems Will be Absent, Minimized or Managed (Labor)  Outcome: Ongoing (interventions implemented as appropriate)   11/21/19 0818   Goal/Outcome Evaluation   Problems Assessed (Labor) all   Problems Present (Labor) (pt getting epidural )

## 2019-11-21 NOTE — PROGRESS NOTES
"11/21/19  8:55 AM  Radha Villanueva      ASSESSMENTS: Now comfortable with epidural.  Denies headache or nausea.    /77   Pulse 77   Temp 97.5 °F (36.4 °C) (Oral)   Resp 20   Ht 170.2 cm (67\")   Wt 73.9 kg (163 lb)   Breastfeeding? Yes   BMI 25.53 kg/m²     Fetal Heart Rate Assessment   Method: Fetal HR Assessment Method: external   Beats/min: Fetal HR (beats/min): 118   Baseline: Fetal Heart Baseline Rate: normal range   Varibility: Fetal HR Variability: moderate (amplitude range 6 to 25 bpm)   Accels: Fetal HR Accelerations: greater than/equal to 15 bpm, lasting at least 15 seconds   Decels: Fetal HR Decelerations: variable   Tracing Category:       Uterine Assessment   Method: Method: external tocotransducer   Frequency (min): Contraction Frequency (Minutes): 2-4   Ctx Count in 10 min:     Duration:     Intensity: Contraction Intensity: moderate by palpation   Intensity by IUPC:     Resting Tone: Uterine Resting Tone: soft by palpation   Resting Tone by IUPC:     [place] Units:                                Presentation: Vertex   Cervix: Exam by: Method: sterile exam per LARA Yanez CNM   Dilation: Cervical Dilation (cm): 5-6   Effacement: Cervical Effacement: 90%   Station: Fetal Station: -1            Lab Results   Component Value Date    WBC 12.81 (H) 11/20/2019    HGB 13.4 11/20/2019    HCT 40.5 11/20/2019    MCV 97.4 (H) 11/20/2019     (L) 11/20/2019     Results from last 7 days   Lab Units 11/20/19  1340   ABO TYPING  O   RH TYPING  Positive   ANTIBODY SCREEN  Negative       PLAN: Continue present management.    Elizabeth Yanez CNM  8:55 AM  11/21/19  "

## 2019-11-22 LAB
BASOPHILS # BLD AUTO: 0.04 10*3/MM3 (ref 0–0.2)
BASOPHILS NFR BLD AUTO: 0.3 % (ref 0–1.5)
DEPRECATED RDW RBC AUTO: 44.1 FL (ref 37–54)
EOSINOPHIL # BLD AUTO: 0.12 10*3/MM3 (ref 0–0.4)
EOSINOPHIL NFR BLD AUTO: 1 % (ref 0.3–6.2)
ERYTHROCYTE [DISTWIDTH] IN BLOOD BY AUTOMATED COUNT: 12.2 % (ref 12.3–15.4)
HCT VFR BLD AUTO: 34.6 % (ref 34–46.6)
HGB BLD-MCNC: 11.2 G/DL (ref 12–15.9)
IMM GRANULOCYTES # BLD AUTO: 0.2 10*3/MM3 (ref 0–0.05)
IMM GRANULOCYTES NFR BLD AUTO: 1.7 % (ref 0–0.5)
LYMPHOCYTES # BLD AUTO: 1.75 10*3/MM3 (ref 0.7–3.1)
LYMPHOCYTES NFR BLD AUTO: 15 % (ref 19.6–45.3)
MCH RBC QN AUTO: 31.7 PG (ref 26.6–33)
MCHC RBC AUTO-ENTMCNC: 32.4 G/DL (ref 31.5–35.7)
MCV RBC AUTO: 98 FL (ref 79–97)
MONOCYTES # BLD AUTO: 0.99 10*3/MM3 (ref 0.1–0.9)
MONOCYTES NFR BLD AUTO: 8.5 % (ref 5–12)
NEUTROPHILS # BLD AUTO: 8.54 10*3/MM3 (ref 1.7–7)
NEUTROPHILS NFR BLD AUTO: 73.5 % (ref 42.7–76)
NRBC BLD AUTO-RTO: 0 /100 WBC (ref 0–0.2)
PLATELET # BLD AUTO: 104 10*3/MM3 (ref 140–450)
PMV BLD AUTO: 13 FL (ref 6–12)
RBC # BLD AUTO: 3.53 10*6/MM3 (ref 3.77–5.28)
WBC NRBC COR # BLD: 11.64 10*3/MM3 (ref 3.4–10.8)

## 2019-11-22 PROCEDURE — 85025 COMPLETE CBC W/AUTO DIFF WBC: CPT | Performed by: ADVANCED PRACTICE MIDWIFE

## 2019-11-22 RX ADMIN — ACETAMINOPHEN 650 MG: 325 TABLET ORAL at 16:04

## 2019-11-22 RX ADMIN — DOCUSATE SODIUM 100 MG: 100 CAPSULE, LIQUID FILLED ORAL at 22:47

## 2019-11-22 RX ADMIN — PRENATAL VITAMINS-IRON FUMARATE 27 MG IRON-FOLIC ACID 0.8 MG TABLET 1 TABLET: at 09:18

## 2019-11-22 RX ADMIN — ACETAMINOPHEN 650 MG: 325 TABLET ORAL at 22:47

## 2019-11-22 RX ADMIN — WITCH HAZEL 1 PAD: 500 SOLUTION RECTAL; TOPICAL at 16:05

## 2019-11-22 RX ADMIN — ACETAMINOPHEN 650 MG: 325 TABLET ORAL at 07:23

## 2019-11-22 NOTE — PLAN OF CARE
Problem: Patient Care Overview  Goal: Plan of Care Review  Outcome: Ongoing (interventions implemented as appropriate)   11/22/19 0693   Coping/Psychosocial   Plan of Care Reviewed With patient;spouse   Plan of Care Review   Progress improving   OTHER   Outcome Summary VSS, PP assessment WNL, pain well controlled with Ibuprofen, breastfeeding well with minimal assist.      Goal: Individualization and Mutuality  Outcome: Ongoing (interventions implemented as appropriate)    Goal: Discharge Needs Assessment  Outcome: Ongoing (interventions implemented as appropriate)      Problem: Postpartum (Vaginal Delivery) (Adult,Obstetrics,Pediatric)  Goal: Signs and Symptoms of Listed Potential Problems Will be Absent, Minimized or Managed (Postpartum)  Outcome: Ongoing (interventions implemented as appropriate)      Problem: Breastfeeding (Adult,Obstetrics,Pediatric)  Goal: Signs and Symptoms of Listed Potential Problems Will be Absent, Minimized or Managed (Breastfeeding)  Outcome: Ongoing (interventions implemented as appropriate)

## 2019-11-22 NOTE — CONSULTS
Continued Stay Note  [facility]     Patient Name: Radha Villanueva  MRN: [id number]  Today's Date: 11/22/2019    Admit Date: 11/20/2019    Discharge Plan     Row Name 11/22/19 1602       Plan    Plan  MSW available     Plan Comments  Visited pt and discussed PPD and provided info. Pt reports doing well. Denies SI/ HI.     Final Discharge Disposition Code  01 - home or self-care        Discharge Codes    No documentation.             Adali Rojas, MSW

## 2019-11-22 NOTE — ANESTHESIA POSTPROCEDURE EVALUATION
Patient: Radha Villanueva    Procedure Summary     Date:  11/21/19 Room / Location:      Anesthesia Start:  0754 Anesthesia Stop:  1143    Procedure:  LABOR ANALGESIA Diagnosis:      Scheduled Providers:   Provider:  Obdulia Argueta DO    Anesthesia Type:  epidural ASA Status:  2          Anesthesia Type: epidural  Last vitals  BP   126/69 (11/22/19 0700)   Temp   98.5 °F (36.9 °C) (11/22/19 0700)   Pulse   82 (11/22/19 0700)   Resp   16 (11/22/19 0700)     SpO2         Post Anesthesia Care and Evaluation    Patient location during evaluation: bedside  Patient participation: complete - patient participated  Level of consciousness: awake and alert  Pain management: adequate  Airway patency: patent  Anesthetic complications: No anesthetic complications    Cardiovascular status: acceptable  Respiratory status: acceptable  Hydration status: acceptable  Post Neuraxial Block status: Motor and sensory function returned to baseline and No signs or symptoms of PDPH

## 2019-11-22 NOTE — PROGRESS NOTES
[facility]  Vaginal Delivery Progress Note    Subjective     Doing well, pain controlled, lochia less than menses, voiding and BM without difficulty. Breastfeeding initiated      Objective     Vital Signs Range for the last 24 hours  Temperature: Temp:  [97.8 °F (36.6 °C)-98.7 °F (37.1 °C)] 98.5 °F (36.9 °C)   Temp Source: Temp src: Oral   BP: BP: (108-139)/(59-84) 126/69   Pulse: Heart Rate:  [66-94] 82   Respirations: Resp:  [16-18] 16   SPO2:     O2 Amount (l/min):     O2 Devices           Physical Exam:  General:  no acute distresss.  Abdomen: Soft, non-tender, fundus firm  Lochia: less than a normal period,  Perineum: not inspected  Extremities: normal, atraumatic, no cyanosis or edema.       Lab results reviewed:  Yes    Lab Results   Component Value Date    WBC 11.64 (H) 11/22/2019    HGB 11.2 (L) 11/22/2019    HCT 34.6 11/22/2019    MCV 98.0 (H) 11/22/2019     (L) 11/22/2019         Assessment/Plan       Normal spontaneous vaginal delivery      Radha Lesjackson is Day 1  post-partum       Plan:  Continue current care.      Elizabeth Yanez CNM  11/22/2019  9:19 AM

## 2019-11-23 VITALS
HEART RATE: 90 BPM | RESPIRATION RATE: 20 BRPM | DIASTOLIC BLOOD PRESSURE: 76 MMHG | BODY MASS INDEX: 25.58 KG/M2 | HEIGHT: 67 IN | WEIGHT: 163 LBS | TEMPERATURE: 97.7 F | SYSTOLIC BLOOD PRESSURE: 125 MMHG

## 2019-11-23 RX ORDER — IBUPROFEN 600 MG/1
600 TABLET ORAL EVERY 6 HOURS PRN
Qty: 30 TABLET | Refills: 0 | Status: SHIPPED | OUTPATIENT
Start: 2019-11-23 | End: 2021-11-20 | Stop reason: HOSPADM

## 2019-11-23 RX ORDER — PSEUDOEPHEDRINE HCL 30 MG
100 TABLET ORAL 2 TIMES DAILY PRN
Start: 2019-11-23

## 2019-11-23 RX ADMIN — PRENATAL VITAMINS-IRON FUMARATE 27 MG IRON-FOLIC ACID 0.8 MG TABLET 1 TABLET: at 07:56

## 2019-11-23 RX ADMIN — DOCUSATE SODIUM 100 MG: 100 CAPSULE, LIQUID FILLED ORAL at 07:56

## 2019-11-23 RX ADMIN — IBUPROFEN 600 MG: 600 TABLET, FILM COATED ORAL at 07:56

## 2019-11-23 NOTE — LACTATION NOTE
1st baby . Instructed in importance of skin to skin. Encouraged and instructed importance of waking infant and attempting to nurse every 2-3hrs. Instructed waking techniques. Instructed presence of and importance of colostrum.  Instructed in ss adq latch and suck including ss complications to report. Instructed in ss adq infant intake. To call if need or concern. VU

## 2019-11-23 NOTE — PROGRESS NOTES
[facility]  Vaginal Delivery Progress Note    Subjective     Doing well, pain controlled, lochia less than menses, voiding and BM without difficulty. Breastfeeding going well.      Objective     Vital Signs Range for the last 24 hours  Temperature: Temp:  [97.7 °F (36.5 °C)-98.7 °F (37.1 °C)] 97.7 °F (36.5 °C)   Temp Source: Temp src: Oral   BP: BP: (106-125)/(56-76) 125/76   Pulse: Heart Rate:  [77-90] 90   Respirations: Resp:  [16-20] 20   SPO2:     O2 Amount (l/min):     O2 Devices           Physical Exam:  General:  no acute distresss.  Abdomen: Soft, non-tender, fundus firm  Lochia: less than a normal period,  Perineum: not inspected  Extremities: normal, atraumatic, no cyanosis, and trace edema.       Lab results reviewed:  Yes    Lab Results   Component Value Date    WBC 11.64 (H) 11/22/2019    HGB 11.2 (L) 11/22/2019    HCT 34.6 11/22/2019    MCV 98.0 (H) 11/22/2019     (L) 11/22/2019         Assessment/Plan       Normal spontaneous vaginal delivery      Radha Villanueva is Day 2  post-partum       Plan:  Discharge home with standard precautions and return to clinic in 6 weeks.      Elizabeth Yanez CNM  11/23/2019  11:30 AM

## 2019-11-23 NOTE — DISCHARGE SUMMARY
[facility]  Delivery Discharge Summary    Primary OB Clinician:     EDC: Estimated Date of Delivery: 19    Gestational Age:38w3d    Antepartum complications: non-reassuring fetal testing    Date of Delivery: 2019   Time of Delivery: 11:38 AM     Delivered By:  Elizabeth Yanez CNM    Delivery Type: Vaginal, Spontaneous      Tubal Ligation: n/a    Baby:female  infant;  Sabra Medina  Apgar:  9   @ 1 minute /   Apgar:  9   @ 5 minutes   Weight: 2860 g (6 lb 4.9 oz)    Length: 19.5     Anesthesia: Epidural      Intrapartum complications: None    Laceration: No    Episiotomy: No    Placenta: Spontaneous     Feeding method: Breastfeeding Status: Yes    Rh Immune globulin given: yes    Rubella vaccine given: not applicable    Discharge Date: 2019; Discharge Time: 11:36 AM    Early Discharge:  NO    Plan:    Address and phone number verified and same.  Follow-up appointment with Elizabeth Yanez CNM in 6 weeks.

## 2019-11-23 NOTE — PLAN OF CARE
Problem: Patient Care Overview  Goal: Plan of Care Review  Outcome: Ongoing (interventions implemented as appropriate)   11/23/19 0528   Coping/Psychosocial   Plan of Care Reviewed With patient   Plan of Care Review   Progress improving   OTHER   Outcome Summary VSS, fundus/lochia/perineum WDL, pain controlled with Tylenol, breastfeeding well     Goal: Individualization and Mutuality  Outcome: Ongoing (interventions implemented as appropriate)    Goal: Discharge Needs Assessment  Outcome: Ongoing (interventions implemented as appropriate)      Problem: Postpartum (Vaginal Delivery) (Adult,Obstetrics,Pediatric)  Goal: Signs and Symptoms of Listed Potential Problems Will be Absent, Minimized or Managed (Postpartum)  Outcome: Ongoing (interventions implemented as appropriate)      Problem: Breastfeeding (Adult,Obstetrics,Pediatric)  Goal: Signs and Symptoms of Listed Potential Problems Will be Absent, Minimized or Managed (Breastfeeding)  Outcome: Ongoing (interventions implemented as appropriate)

## 2019-11-25 NOTE — PAYOR COMM NOTE
"Maxim Espino (19 y.o. Female)   Discharge Date     Date of Birth Social Security Number Address Home Phone MRN    2000  [facility] [place] [address] [phone] [id number]    Confucianist Marital Status          None        Admission Date Admission Type Admitting Provider Attending Provider Department, Room/Bed    19 Elective Gabby Morrow [facility] MOTHER BABY 4B, N425/1    Discharge Date Discharge Disposition Discharge Destination        2019 Home or Self Care              Attending Provider:  (none)   Allergies:  No Known Allergies    Isolation:  None   Infection:  None   Code Status:  Prior    Ht:  170.2 cm (67\")   Wt:  73.9 kg (163 lb)    Admission Cmt:  None   Principal Problem:  None                Active Insurance as of 2019     Primary Coverage     Payor Plan Insurance Group Employer/Plan Group    [employer] [place] [employer]      Payor Plan Address Payor Plan Phone Number Payor Plan Fax Number Effective Dates    [address] [address] [phone]  2017 - None Entered    [facility] [id number]       Subscriber Name Subscriber Birth Date Member ID       MAXIM ESPINO 2000 [id number]                 Emergency Contacts      (Rel.) Home Phone Work Phone Mobile Phone    Aki Espino (Spouse) [phone] -- --                 Discharge Summary      Elizabeth Yanez CNM at 19 1134          [facility]  Delivery Discharge Summary    Primary OB Clinician:     EDC: Estimated Date of Delivery: 19    Gestational Age:38w3d    Antepartum complications: non-reassuring fetal testing    Date of Delivery: 2019   Time of Delivery: 11:38 AM     Delivered By:  Elizabeth Yanez CNM    Delivery Type: Vaginal, Spontaneous      Tubal Ligation: n/a    Baby:female  infant;  Sabra Medina  Apgar:  9   @ 1 minute /   Apgar:  9   @ 5 minutes   Weight: 2860 g (6 lb 4.9 oz)    Length: 19.5     Anesthesia: Epidural  "     Intrapartum complications: None    Laceration: No    Episiotomy: No    Placenta: Spontaneous     Feeding method: Breastfeeding Status: Yes    Rh Immune globulin given: yes    Rubella vaccine given: not applicable    Discharge Date: 11/23/2019; Discharge Time: 11:36 AM    Early Discharge:  NO    Plan:    Address and phone number verified and same.  Follow-up appointment with Elizabeth Yanez CNM in 6 weeks.    Electronically signed by Gabby Morrow DO at 11/23/19 4166

## 2020-01-06 ENCOUNTER — TRANSCRIBE ORDERS (OUTPATIENT)
Dept: LAB | Facility: HOSPITAL | Age: 20
End: 2020-01-06

## 2020-01-06 ENCOUNTER — APPOINTMENT (OUTPATIENT)
Dept: LAB | Facility: HOSPITAL | Age: 20
End: 2020-01-06

## 2020-01-06 DIAGNOSIS — R30.0 DYSURIA: Primary | ICD-10-CM

## 2020-01-06 LAB
BACTERIA UR QL AUTO: ABNORMAL /HPF
BILIRUB UR QL STRIP: NEGATIVE
CLARITY UR: CLEAR
COLOR UR: YELLOW
GLUCOSE UR STRIP-MCNC: NEGATIVE MG/DL
HGB UR QL STRIP.AUTO: ABNORMAL
HYALINE CASTS UR QL AUTO: ABNORMAL /LPF
KETONES UR QL STRIP: NEGATIVE
LEUKOCYTE ESTERASE UR QL STRIP.AUTO: ABNORMAL
NITRITE UR QL STRIP: POSITIVE
PH UR STRIP.AUTO: 6 [PH] (ref 5–8)
PROT UR QL STRIP: NEGATIVE
RBC # UR: ABNORMAL /HPF
REF LAB TEST METHOD: ABNORMAL
SP GR UR STRIP: 1.02 (ref 1–1.03)
SQUAMOUS #/AREA URNS HPF: ABNORMAL /HPF
UROBILINOGEN UR QL STRIP: ABNORMAL
WBC UR QL AUTO: ABNORMAL /HPF

## 2020-01-06 PROCEDURE — 81001 URINALYSIS AUTO W/SCOPE: CPT | Performed by: ADVANCED PRACTICE MIDWIFE

## 2020-01-06 PROCEDURE — 87086 URINE CULTURE/COLONY COUNT: CPT | Performed by: ADVANCED PRACTICE MIDWIFE

## 2020-01-06 PROCEDURE — 87077 CULTURE AEROBIC IDENTIFY: CPT | Performed by: ADVANCED PRACTICE MIDWIFE

## 2020-01-06 PROCEDURE — 87186 SC STD MICRODIL/AGAR DIL: CPT | Performed by: ADVANCED PRACTICE MIDWIFE

## 2020-01-08 LAB — BACTERIA SPEC AEROBE CULT: ABNORMAL

## 2021-08-24 ENCOUNTER — LAB (OUTPATIENT)
Dept: LAB | Facility: HOSPITAL | Age: 21
End: 2021-08-24

## 2021-08-24 ENCOUNTER — TRANSCRIBE ORDERS (OUTPATIENT)
Dept: LAB | Facility: HOSPITAL | Age: 21
End: 2021-08-24

## 2021-08-24 DIAGNOSIS — Z32.01 PREGNANCY EXAMINATION OR TEST, POSITIVE RESULT: ICD-10-CM

## 2021-08-24 DIAGNOSIS — Z32.01 PREGNANCY EXAMINATION OR TEST, POSITIVE RESULT: Primary | ICD-10-CM

## 2021-08-24 PROCEDURE — 84702 CHORIONIC GONADOTROPIN TEST: CPT

## 2021-08-24 PROCEDURE — 84144 ASSAY OF PROGESTERONE: CPT

## 2021-08-24 PROCEDURE — 36415 COLL VENOUS BLD VENIPUNCTURE: CPT

## 2021-08-25 LAB
HCG INTACT+B SERPL-ACNC: NORMAL MIU/ML
PROGEST SERPL-MCNC: 25.3 NG/ML

## 2021-09-08 ENCOUNTER — TRANSCRIBE ORDERS (OUTPATIENT)
Dept: LAB | Facility: HOSPITAL | Age: 21
End: 2021-09-08

## 2021-09-08 ENCOUNTER — LAB (OUTPATIENT)
Dept: LAB | Facility: HOSPITAL | Age: 21
End: 2021-09-08

## 2021-09-08 DIAGNOSIS — Z34.81 PRENATAL CARE, SUBSEQUENT PREGNANCY, FIRST TRIMESTER: ICD-10-CM

## 2021-09-08 DIAGNOSIS — Z34.81 PRENATAL CARE, SUBSEQUENT PREGNANCY, FIRST TRIMESTER: Primary | ICD-10-CM

## 2021-09-08 LAB
ABO GROUP BLD: NORMAL
AMPHET+METHAMPHET UR QL: NEGATIVE
AMPHETAMINES UR QL: NEGATIVE
BACTERIA UR QL AUTO: ABNORMAL /HPF
BARBITURATES UR QL SCN: NEGATIVE
BASOPHILS # BLD AUTO: 0.08 10*3/MM3 (ref 0–0.2)
BASOPHILS NFR BLD AUTO: 0.7 % (ref 0–1.5)
BENZODIAZ UR QL SCN: NEGATIVE
BILIRUB UR QL STRIP: NEGATIVE
BLD GP AB SCN SERPL QL: NEGATIVE
BUPRENORPHINE SERPL-MCNC: NEGATIVE NG/ML
CANNABINOIDS SERPL QL: NEGATIVE
CLARITY UR: ABNORMAL
COCAINE UR QL: NEGATIVE
COLOR UR: ABNORMAL
DEPRECATED RDW RBC AUTO: 40.8 FL (ref 37–54)
EOSINOPHIL # BLD AUTO: 0.28 10*3/MM3 (ref 0–0.4)
EOSINOPHIL NFR BLD AUTO: 2.3 % (ref 0.3–6.2)
ERYTHROCYTE [DISTWIDTH] IN BLOOD BY AUTOMATED COUNT: 11.7 % (ref 12.3–15.4)
GLUCOSE SERPL-MCNC: 93 MG/DL (ref 65–99)
GLUCOSE UR STRIP-MCNC: NEGATIVE MG/DL
HBV SURFACE AG SERPL QL IA: NORMAL
HCT VFR BLD AUTO: 41.3 % (ref 34–46.6)
HGB BLD-MCNC: 13.5 G/DL (ref 12–15.9)
HGB UR QL STRIP.AUTO: NEGATIVE
HYALINE CASTS UR QL AUTO: ABNORMAL /LPF
IMM GRANULOCYTES # BLD AUTO: 0.09 10*3/MM3 (ref 0–0.05)
IMM GRANULOCYTES NFR BLD AUTO: 0.7 % (ref 0–0.5)
KETONES UR QL STRIP: NEGATIVE
LEUKOCYTE ESTERASE UR QL STRIP.AUTO: ABNORMAL
LYMPHOCYTES # BLD AUTO: 1.76 10*3/MM3 (ref 0.7–3.1)
LYMPHOCYTES NFR BLD AUTO: 14.6 % (ref 19.6–45.3)
MCH RBC QN AUTO: 30.7 PG (ref 26.6–33)
MCHC RBC AUTO-ENTMCNC: 32.7 G/DL (ref 31.5–35.7)
MCV RBC AUTO: 93.9 FL (ref 79–97)
METHADONE UR QL SCN: NEGATIVE
MONOCYTES # BLD AUTO: 0.97 10*3/MM3 (ref 0.1–0.9)
MONOCYTES NFR BLD AUTO: 8 % (ref 5–12)
NEUTROPHILS NFR BLD AUTO: 73.7 % (ref 42.7–76)
NEUTROPHILS NFR BLD AUTO: 8.88 10*3/MM3 (ref 1.7–7)
NITRITE UR QL STRIP: NEGATIVE
NRBC BLD AUTO-RTO: 0 /100 WBC (ref 0–0.2)
OPIATES UR QL: NEGATIVE
OXYCODONE UR QL SCN: NEGATIVE
PCP UR QL SCN: NEGATIVE
PH UR STRIP.AUTO: 7.5 [PH] (ref 5–8)
PLATELET # BLD AUTO: 253 10*3/MM3 (ref 140–450)
PMV BLD AUTO: 12.2 FL (ref 6–12)
PROPOXYPH UR QL: NEGATIVE
PROT UR QL STRIP: NEGATIVE
RBC # BLD AUTO: 4.4 10*6/MM3 (ref 3.77–5.28)
RBC # UR: ABNORMAL /HPF
REF LAB TEST METHOD: ABNORMAL
RH BLD: POSITIVE
SP GR UR STRIP: 1.02 (ref 1–1.03)
SQUAMOUS #/AREA URNS HPF: ABNORMAL /HPF
TRICYCLICS UR QL SCN: NEGATIVE
UNIDENT CRYS URNS QL MICRO: ABNORMAL /HPF
UROBILINOGEN UR QL STRIP: ABNORMAL
WBC # BLD AUTO: 12.06 10*3/MM3 (ref 3.4–10.8)
WBC UR QL AUTO: ABNORMAL /HPF

## 2021-09-08 PROCEDURE — 80081 OBSTETRIC PANEL INC HIV TSTG: CPT

## 2021-09-08 PROCEDURE — 84443 ASSAY THYROID STIM HORMONE: CPT

## 2021-09-08 PROCEDURE — 80306 DRUG TEST PRSMV INSTRMNT: CPT

## 2021-09-08 PROCEDURE — 82947 ASSAY GLUCOSE BLOOD QUANT: CPT

## 2021-09-08 PROCEDURE — 86803 HEPATITIS C AB TEST: CPT

## 2021-09-08 PROCEDURE — 86762 RUBELLA ANTIBODY: CPT

## 2021-09-08 PROCEDURE — 81001 URINALYSIS AUTO W/SCOPE: CPT

## 2021-09-08 PROCEDURE — 36415 COLL VENOUS BLD VENIPUNCTURE: CPT

## 2021-09-09 LAB
HCV AB SER DONR QL: NORMAL
HIV1+2 AB SER QL: NORMAL
RPR SER QL: NORMAL
RUBV IGG SERPL IA-ACNC: 2.01 INDEX
TSH SERPL DL<=0.05 MIU/L-ACNC: 1.56 UIU/ML (ref 0.27–4.2)

## 2021-11-11 ENCOUNTER — TELEPHONE (OUTPATIENT)
Dept: OBSTETRICS AND GYNECOLOGY | Facility: HOSPITAL | Age: 21
End: 2021-11-11

## 2021-11-19 ENCOUNTER — HOSPITAL ENCOUNTER (OUTPATIENT)
Facility: HOSPITAL | Age: 21
Setting detail: OBSERVATION
Discharge: HOME | End: 2021-11-20
Attending: ADVANCED PRACTICE MIDWIFE | Admitting: OBSTETRICS & GYNECOLOGY

## 2021-11-19 PROBLEM — O23.02 PYELONEPHRITIS AFFECTING PREGNANCY IN SECOND TRIMESTER: Status: ACTIVE | Noted: 2021-11-19

## 2021-11-19 LAB
BACTERIA UR QL AUTO: ABNORMAL /HPF
BILIRUB UR QL STRIP: NEGATIVE
CLARITY UR: ABNORMAL
COLOR UR: YELLOW
GLUCOSE UR STRIP-MCNC: NEGATIVE MG/DL
HGB UR QL STRIP.AUTO: ABNORMAL
HYALINE CASTS UR QL AUTO: ABNORMAL /LPF
KETONES UR QL STRIP: NEGATIVE
LEUKOCYTE ESTERASE UR QL STRIP.AUTO: ABNORMAL
NITRITE UR QL STRIP: NEGATIVE
PH UR STRIP.AUTO: 6 [PH] (ref 5–8)
PROT UR QL STRIP: NEGATIVE
RBC # UR STRIP: ABNORMAL /HPF
REF LAB TEST METHOD: ABNORMAL
SARS-COV-2 RDRP RESP QL NAA+PROBE: NORMAL
SP GR UR STRIP: 1.01 (ref 1–1.03)
SQUAMOUS #/AREA URNS HPF: ABNORMAL /HPF
UROBILINOGEN UR QL STRIP: ABNORMAL
WBC # UR STRIP: ABNORMAL /HPF

## 2021-11-19 PROCEDURE — G0378 HOSPITAL OBSERVATION PER HR: HCPCS

## 2021-11-19 PROCEDURE — C9803 HOPD COVID-19 SPEC COLLECT: HCPCS

## 2021-11-19 PROCEDURE — 25010000002 CEFTRIAXONE PER 250 MG: Performed by: OBSTETRICS & GYNECOLOGY

## 2021-11-19 PROCEDURE — 99218 PR INITIAL OBSERVATION CARE/DAY 30 MINUTES: CPT | Performed by: OBSTETRICS & GYNECOLOGY

## 2021-11-19 PROCEDURE — 87186 SC STD MICRODIL/AGAR DIL: CPT | Performed by: OBSTETRICS & GYNECOLOGY

## 2021-11-19 PROCEDURE — 96365 THER/PROPH/DIAG IV INF INIT: CPT

## 2021-11-19 PROCEDURE — 96361 HYDRATE IV INFUSION ADD-ON: CPT

## 2021-11-19 PROCEDURE — 81001 URINALYSIS AUTO W/SCOPE: CPT | Performed by: OBSTETRICS & GYNECOLOGY

## 2021-11-19 PROCEDURE — 87077 CULTURE AEROBIC IDENTIFY: CPT | Performed by: OBSTETRICS & GYNECOLOGY

## 2021-11-19 PROCEDURE — 87086 URINE CULTURE/COLONY COUNT: CPT | Performed by: OBSTETRICS & GYNECOLOGY

## 2021-11-19 PROCEDURE — 87635 SARS-COV-2 COVID-19 AMP PRB: CPT | Performed by: OBSTETRICS & GYNECOLOGY

## 2021-11-19 RX ORDER — SODIUM CHLORIDE 9 MG/ML
125 INJECTION, SOLUTION INTRAVENOUS CONTINUOUS
Status: DISCONTINUED | OUTPATIENT
Start: 2021-11-19 | End: 2021-11-20 | Stop reason: HOSPADM

## 2021-11-19 RX ORDER — ACETAMINOPHEN 500 MG
1000 TABLET ORAL EVERY 6 HOURS PRN
Status: DISCONTINUED | OUTPATIENT
Start: 2021-11-19 | End: 2021-11-20 | Stop reason: HOSPADM

## 2021-11-19 RX ORDER — IBUPROFEN 600 MG/1
600 TABLET ORAL ONCE
Status: COMPLETED | OUTPATIENT
Start: 2021-11-19 | End: 2021-11-19

## 2021-11-19 RX ADMIN — ACETAMINOPHEN 1000 MG: 500 TABLET, FILM COATED ORAL at 12:07

## 2021-11-19 RX ADMIN — IBUPROFEN 600 MG: 600 TABLET ORAL at 16:32

## 2021-11-19 RX ADMIN — ACETAMINOPHEN 1000 MG: 500 TABLET, FILM COATED ORAL at 03:30

## 2021-11-19 RX ADMIN — SODIUM CHLORIDE 125 ML/HR: 9 INJECTION, SOLUTION INTRAVENOUS at 03:05

## 2021-11-19 RX ADMIN — SODIUM CHLORIDE 125 ML/HR: 9 INJECTION, SOLUTION INTRAVENOUS at 10:22

## 2021-11-19 RX ADMIN — SODIUM CHLORIDE 1 G: 900 INJECTION INTRAVENOUS at 03:14

## 2021-11-19 RX ADMIN — SODIUM CHLORIDE 125 ML/HR: 9 INJECTION, SOLUTION INTRAVENOUS at 18:23

## 2021-11-19 RX ADMIN — ACETAMINOPHEN 1000 MG: 500 TABLET, FILM COATED ORAL at 18:23

## 2021-11-19 RX ADMIN — SODIUM CHLORIDE 1 G: 900 INJECTION INTRAVENOUS at 15:15

## 2021-11-19 NOTE — H&P
"[facility]  Obstetric History and Physical    Chief Complaint   Patient presents with   • Back Pain   • Abdominal Pain       HPI:    Patient is a 21 y.o. female  currently at 18w1d, who presents with left side back pain that radiates around to her front and also some mild lower uterine \"stabbing\"  She says she gets frequent UTIs - mostly when she is pregnant.  She was given Macrobid previously (2 years ago) to take a prophylaxis during the remainder of her pregnancy, but failed to do so.   When she started to feel like this was another UTI, she took a single dose of the 2 year old Macrobid which of course did not help.  She says she feels like she has a fever, but has a normal temp here.  She denies chills.  She has had a couple of bouts of nausea.  She has no other specific pregnancy symptoms.         The following portions of the patients history were reviewed and updated as appropriate: current medications, allergies, past medical history, past surgical history, past family history, past social history and problem list .       Prenatal Information:   Maternal Prenatal Labs  Blood Type No results found for: ABO   Rh Status No results found for: RH   Antibody Screen No results found for: ABSCRN   Gonnorhea No results found for: GCCX   Chlamydia No results found for: CLAMYDCU   RPR No results found for: RPR   Syphilis Antibody No results found for: SYPHILIS   Rubella No results found for: RUBELLAIGGIN   Hepatitis B Surface Antigen No results found for: HEPBSAG   HIV-1 Antibody No results found for: LABHIV1   Hepatitis C Antibody No results found for: HEPCAB   Rapid Urin Drug Screen No results found for: AMPMETHU, BARBITSCNUR, LABBENZSCN, LABMETHSCN, LABOPIASCN, THCURSCR, COCAINEUR, AMPHETSCREEN, PROPOXSCN, BUPRENORSCNU, METAMPSCNUR, OXYCODONESCN, TRICYCLICSCN   Group B Strep Culture No results found for: GBSANTIGEN           External Prenatal Results     Pregnancy Outside Results - Transcribed From Office " Records - See Scanned Records For Details     Test Value Date Time    ABO  O  21 1223    Rh  Positive  21 1223    Antibody Screen  Negative  21 1223    Varicella IgG       Rubella  2.01 index 21 1223    Hgb  13.5 g/dL 21 1223    Hct  41.3 % 21 1223    Glucose Fasting GTT       Glucose Tolerance Test 1 hour ^ 148  19     Glucose Tolerance Test 3 hour       Gonorrhea (discrete)       Chlamydia (discrete)       RPR  Non-Reactive  21 1223    VDRL       Syphilis Antibody       HBsAg  Non-Reactive  21 1223    Herpes Simplex Virus PCR       Herpes Simplex VIrus Culture       HIV  Non-Reactive  21 1223    Hep C RNA Quant PCR       Hep C Antibody  Non-Reactive  21 1223    AFP       Group B Strep ^ Negative  19     GBS Susceptibility to Clindamycin       GBS Susceptibility to Erythromycin       Fetal Fibronectin       Genetic Testing, Maternal Blood             Drug Screening     Test Value Date Time    Urine Drug Screen ^ Negative  19     Amphetamine Screen  Negative  21 1223    Barbiturate Screen  Negative  21 1223    Benzodiazepine Screen  Negative  21 1223    Methadone Screen  Negative  21 1223    Phencyclidine Screen  Negative  21 1223    Opiates Screen  Negative  21 1223    THC Screen  Negative  21 1223    Cocaine Screen       Propoxyphene Screen  Negative  21 1223    Buprenorphine Screen  Negative  21 1223    Methamphetamine Screen       Oxycodone Screen  Negative  21 1223    Tricyclic Antidepressants Screen  Negative  21 1223          Legend    ^: Historical                          Past OB History:     OB History    Para Term  AB Living   3 1 1 0 1 1   SAB IAB Ectopic Molar Multiple Live Births   0 0 0 0 0 1      # Outcome Date GA Lbr Wilian/2nd Weight Sex Delivery Anes PTL Lv   3 Current            2 Term 19 38w3d 13:21 / 00:15 2860 g (6 lb 4.9 oz) F  Vag-Spont EPI N TYLER      Name: CHUCKY ESPINO      Apgar1: 9  Apgar5: 9   1 AB 11/24/18 7w0d    SAB          Past Medical History: Past Medical History:   Diagnosis Date   • Bartholin gland cyst 2017    removed   • Urinary tract infection       Past Surgical History Past Surgical History:   Procedure Laterality Date   • APPENDECTOMY  2021   • CYST REMOVAL      bartholin   • TONSILLECTOMY  2006   • WISDOM TOOTH EXTRACTION        Family History: Family History   Problem Relation Age of Onset   • No Known Problems Mother    • No Known Problems Father    • Cancer Maternal Grandmother         Unsure what kind   • No Known Problems Maternal Grandfather    • No Known Problems Paternal Grandmother    • No Known Problems Paternal Grandfather       Social History:  reports that she has never smoked. She has never used smokeless tobacco.   reports no history of alcohol use.   reports no history of drug use.        Review of Systems  Denies  HA, CP, Shortness of air, muscle weakness,                                             and rashes      Objective     Vital Signs Range for the last 24 hours  Temperature: Temp:  [98.8 °F (37.1 °C)] 98.8 °F (37.1 °C)   Temp Source: Temp src: Oral   BP: BP: (119)/(70) 119/70   Pulse: Heart Rate:  [98] 98   Respirations: Resp:  [20] 20   SPO2:     O2 Amount (l/min):     O2 Devices     Weight: Weight:  [61.7 kg (136 lb)] 61.7 kg (136 lb)     Physical Examination: General appearance - alert,  and in no distress and oriented to person, place, and time  Chest - clear to auscultation, no wheezes, rales or rhonchi, symmetric air entry  Heart - S1 and S2 normal, no murmurs noted  Abdomen - soft, nondistended, no masses or organomegaly  no rebound tenderness noted  bowel sounds normal  No guarding, No RUQ pain  Some mild LLQ tenderness.  Back:   Left sided CVA tenderness.   Extremities - pedal edema - none     Presentation: Cephalic by USN                      Fetal Heart Rate Assessment    Method:     Beats/min: Fetal HR (beats/min): 154   Baseline:     Varibility:     Accels:     Decels:     Tracing Category:       Uterine Assessment   Method:     Frequency (min):  None    Ctx Count in 10 min:     Duration:     Intensity:     Intensity by IUPC:     Resting Tone:     Resting Tone by IUPC:           Laboratory Results:  Lab Results   Component Value Date    SQUAMEPIUA 0-2 11/19/2021    SPECGRAVUR 1.012 11/19/2021    KETONESU Negative 11/19/2021    BLOODU Small (1+) (A) 11/19/2021    LEUKOCYTESUR Moderate (2+) (A) 11/19/2021    NITRITEU Negative 11/19/2021    RBCUA 7-12 (A) 11/19/2021    WBCUA 13-20 (A) 11/19/2021    BACTERIA 3+ (A) 11/19/2021           Assessment:  1. Intrauterine pregnancy at 18w1d weeks gestation with cardiac activity  2. Pyelonephritis      Plan:  1. Admit  2. IV, 1 gm Rocephin q12 hrs times two doses  3. Discharge with Macrobid 1 PO BID x 7 days  4.  Consider prophylaxis therapy thereafter  5.  Regular diet  6.  FHTS daily.       Bhupinder Bryan MD  11/19/2021  02:57 EST

## 2021-11-19 NOTE — PROGRESS NOTES
11/19/2021  APU Progress Note    Subjective   Radha feels better this morning. She notes that her back pain has improved. She does report dysuria. She notes hx of recurrent UTIs with her first pregnancy, was recommended to take macrobid suppression but elected to decline. She has an overnight cabin trip planned to [place] tomorrow, would like to go home today if possible.       Objective   Temp: Temp:  [98.4 °F (36.9 °C)-98.8 °F (37.1 °C)] 98.4 °F (36.9 °C) Temp src: Oral   BP: BP: (102-119)/(51-70) 102/51        Pulse: Heart Rate:  [90-98] 90  RR: Resp:  [16-20] 18    General:  No acute distress   Abdomen: Soft    Pelvis: deferred     Lab Results   Component Value Date    WBC 12.06 (H) 09/08/2021    HGB 13.5 09/08/2021    HCT 41.3 09/08/2021    MCV 93.9 09/08/2021     09/08/2021    HEPBSAG Non-Reactive 09/08/2021       Assessment  1. IUP @ 18w1d  2. Pyelonephritis    Plan  1. S/p rocephin 1g IV x 1 dose, repeat in 12h  2. If improved, can discharge home this afternoon on macrobid 100mg PO BID x 7 days; discussed considering macrobid suppression after completion of treatment; urine cx pending      This note has been electronically signed.    Cierra Gordon MD  08:18 EST  November 19, 2021

## 2021-11-20 ENCOUNTER — HOSPITAL ENCOUNTER (OUTPATIENT)
Facility: HOSPITAL | Age: 21
End: 2021-11-20
Attending: OBSTETRICS & GYNECOLOGY | Admitting: OBSTETRICS & GYNECOLOGY

## 2021-11-20 VITALS
SYSTOLIC BLOOD PRESSURE: 102 MMHG | HEIGHT: 67 IN | HEART RATE: 90 BPM | DIASTOLIC BLOOD PRESSURE: 59 MMHG | WEIGHT: 136 LBS | RESPIRATION RATE: 16 BRPM | BODY MASS INDEX: 21.35 KG/M2 | TEMPERATURE: 98.4 F

## 2021-11-20 PROCEDURE — 96361 HYDRATE IV INFUSION ADD-ON: CPT

## 2021-11-20 PROCEDURE — G0378 HOSPITAL OBSERVATION PER HR: HCPCS

## 2021-11-20 RX ORDER — NITROFURANTOIN 25; 75 MG/1; MG/1
100 CAPSULE ORAL DAILY
Qty: 30 CAPSULE | Refills: 2 | Status: SHIPPED | OUTPATIENT
Start: 2021-11-20 | End: 2021-12-20

## 2021-11-20 RX ORDER — NITROFURANTOIN 25; 75 MG/1; MG/1
100 CAPSULE ORAL EVERY 12 HOURS SCHEDULED
Status: DISCONTINUED | OUTPATIENT
Start: 2021-11-20 | End: 2021-11-20 | Stop reason: HOSPADM

## 2021-11-20 RX ORDER — NITROFURANTOIN 25; 75 MG/1; MG/1
100 CAPSULE ORAL EVERY 12 HOURS SCHEDULED
Qty: 13 CAPSULE | Refills: 0 | Status: SHIPPED | OUTPATIENT
Start: 2021-11-20 | End: 2021-11-27

## 2021-11-20 RX ADMIN — ACETAMINOPHEN 1000 MG: 500 TABLET, FILM COATED ORAL at 12:18

## 2021-11-20 RX ADMIN — DOXYLAMINE SUCCINATE 25 MG: 25 TABLET ORAL at 01:21

## 2021-11-20 RX ADMIN — NITROFURANTOIN (MONOHYDRATE/MACROCRYSTALS) 100 MG: 75; 25 CAPSULE ORAL at 12:18

## 2021-11-20 RX ADMIN — SODIUM CHLORIDE 125 ML/HR: 9 INJECTION, SOLUTION INTRAVENOUS at 03:19

## 2021-11-20 NOTE — DISCHARGE SUMMARY
Date of Discharge:  11/20/2021    Discharge Diagnosis: IUP @ 18w2d, pyelonephritis     Presenting Problem/History of Present Illness  Pyelonephritis affecting pregnancy in second trimester [O23.02]       Hospital Course  Patient is a 21 y.o. female presented with c/o flank pain. UA showed likely UTI, urine cx pending, preliminary result is gram negative bacilli, sensitivities pending. Physical exam showed +CVA tenderness. She was admitted for IV rocephin x 2 doses. She was discharged home on HD#2 with macrobid 100mg PO BID x 7 days followed by daily macrobid suppression. She has hx of recurrent UTIs in prior pregnancy.    Procedures Performed         Consults:   Consults     No orders found for last 30 day(s).          Labs:  Lab Results   Component Value Date    WBC 12.06 (H) 09/08/2021    HGB 13.5 09/08/2021    HCT 41.3 09/08/2021    MCV 93.9 09/08/2021     09/08/2021        Urine Culture - Urine, Urine, Clean Catch  Order: [id number] - Reflex for Order [id number]   Collected 11/19/2021 01:00     Status: Preliminary result     Visible to patient: No (not released)    Specimen Information: Urine, Clean Catch         0 Result Notes    Urine Culture >100,000 CFU/mL Gram Negative Bacilli Abnormal             Resulting Agency:  [facility]           Specimen Collected: 11/19/21 01:00 Last Resulted: 11/20/21 11:49                  Discharge Disposition:  To Home    Condition on Discharge:  Stable    Vital Signs  Temp:  [97.8 °F (36.6 °C)-98.6 °F (37 °C)] 98.4 °F (36.9 °C)  Heart Rate:  [81-90] 90  Resp:  [16-18] 16  BP: ()/(50-59) 102/59      Discharge Disposition  Home or Self Care    Discharge Medications     Discharge Medications      Changes to Medications      Instructions Start Date   nitrofurantoin (macrocrystal-monohydrate) 100 MG capsule  Commonly known as: MACROBID  What changed: when to take this   100 mg, Oral, Every 12 Hours Scheduled      nitrofurantoin (macrocrystal-monohydrate) 100 MG  capsule  Commonly known as: Macrobid  What changed: You were already taking a medication with the same name, and this prescription was added. Make sure you understand how and when to take each.   100 mg, Oral, Daily, Begin daily prophylaxis after current treatment course         Continue These Medications      Instructions Start Date   acetaminophen 500 MG tablet  Commonly known as: TYLENOL   1,000 mg, Oral, Every 6 Hours PRN      docusate sodium 100 MG capsule   100 mg, Oral, 2 Times Daily PRN      doxylamine 25 MG tablet  Commonly known as: UNISOM   25 mg, Oral, Nightly PRN      Prenatal 27-1 27-1 MG tablet tablet   Oral, Daily         Stop These Medications    ibuprofen 600 MG tablet  Commonly known as: ADVIL,MOTRIN            Discharge Diet: Regular    Activity at Discharge: No restrictions    Follow-up Appointments  12/3/21 with CNM as scheduled      Test Results Pending at Discharge  Pending Labs     Order Current Status    Urine Culture - Urine, Urine, Clean Catch Preliminary result           Cierra Gordon MD  11/20/21  12:20 EST    Time: 20 minutes

## 2021-11-20 NOTE — PLAN OF CARE
Problem: Adult Inpatient Plan of Care  Goal: Plan of Care Review  Outcome: Ongoing, Progressing  Flowsheets (Taken 11/19/2021 1925)  Progress: improving  Plan of Care Reviewed With:   patient   spouse  Goal: Patient-Specific Goal (Individualized)  Outcome: Ongoing, Progressing  Goal: Absence of Hospital-Acquired Illness or Injury  Outcome: Ongoing, Progressing  Intervention: Identify and Manage Fall Risk  Description: Perform standard risk assessment with a validated tool or comprehensive approach appropriate to the patient on admission; reassess fall risk frequently, with change in status or transfer to another level of care.  Communicate fall injury risk to interprofessional healthcare team.  Determine need for increased observation, equipment and environmental modification, such as low bed and signage, as well as supportive, nonskid footwear.  Adjust safety measures to individual developmental age, stage and identified risk factors.  Reinforce the importance of safety and physical activity with patient and family.  Perform regular intentional rounding to assess need for position change, pain assessment, personal needs, including assistance with toileting.  Recent Flowsheet Documentation  Taken 11/19/2021 1823 by Lesa King, RN  Safety Promotion/Fall Prevention:   safety round/check completed   assistive device/personal items within reach   clutter free environment maintained   fall prevention program maintained   nonskid shoes/slippers when out of bed  Taken 11/19/2021 1720 by Lesa King, RN  Safety Promotion/Fall Prevention:   safety round/check completed   clutter free environment maintained   assistive device/personal items within reach   fall prevention program maintained   nonskid shoes/slippers when out of bed  Taken 11/19/2021 1632 by Lesa King, RN  Safety Promotion/Fall Prevention:   safety round/check completed   clutter free environment maintained   assistive device/personal items within  reach   fall prevention program maintained   nonskid shoes/slippers when out of bed  Taken 11/19/2021 1515 by Lesa King RN  Safety Promotion/Fall Prevention:   safety round/check completed   clutter free environment maintained   assistive device/personal items within reach   fall prevention program maintained   nonskid shoes/slippers when out of bed  Taken 11/19/2021 1414 by Lesa King RN  Safety Promotion/Fall Prevention:   safety round/check completed   assistive device/personal items within reach   clutter free environment maintained   fall prevention program maintained   nonskid shoes/slippers when out of bed  Taken 11/19/2021 1315 by Lesa King RN  Safety Promotion/Fall Prevention:   safety round/check completed   assistive device/personal items within reach   clutter free environment maintained   fall prevention program maintained   nonskid shoes/slippers when out of bed  Taken 11/19/2021 1220 by Lesa King RN  Safety Promotion/Fall Prevention:   safety round/check completed   assistive device/personal items within reach   clutter free environment maintained   fall prevention program maintained   nonskid shoes/slippers when out of bed  Taken 11/19/2021 1121 by Lesa King RN  Safety Promotion/Fall Prevention:   safety round/check completed   assistive device/personal items within reach   clutter free environment maintained   fall prevention program maintained   nonskid shoes/slippers when out of bed  Taken 11/19/2021 1022 by Lesa King RN  Safety Promotion/Fall Prevention:   safety round/check completed   assistive device/personal items within reach   clutter free environment maintained   fall prevention program maintained   nonskid shoes/slippers when out of bed  Taken 11/19/2021 0930 by Lesa King RN  Safety Promotion/Fall Prevention:   safety round/check completed   assistive device/personal items within reach   clutter free environment maintained   fall prevention program  maintained   nonskid shoes/slippers when out of bed  Taken 11/19/2021 0807 by Lesa King RN  Safety Promotion/Fall Prevention:   safety round/check completed   assistive device/personal items within reach   clutter free environment maintained   fall prevention program maintained   nonskid shoes/slippers when out of bed  Taken 11/19/2021 0745 by Lesa King RN  Safety Promotion/Fall Prevention:   safety round/check completed   clutter free environment maintained   assistive device/personal items within reach   fall prevention program maintained   nonskid shoes/slippers when out of bed  Intervention: Prevent Skin Injury  Description: Assess skin risk on admission and at regular intervals throughout hospital stay.  Keep all areas of skin (especially folds) clean and dry.  Maintain adequate skin hydration.  Relieve and redistribute pressure and protect bony prominences; implement measures based on patient-specific risk factors.  Match turning and repositioning schedule to clinical condition.  Encourage weight shift frequently; assist with reposition if unable to complete independently.  Float heels off bed. Avoid pressure on the Achilles tendon.  Keep skin free from extended contact with medical devices.  Use aids (e.g., slide boards, mechanical lift) during transfer.  Recent Flowsheet Documentation  Taken 11/19/2021 1632 by Lesa King RN  Body Position: supine  Skin Protection:   adhesive use limited   transparent dressing maintained  Taken 11/19/2021 0745 by Lesa King RN  Body Position: supine  Skin Protection:   adhesive use limited   transparent dressing maintained  Intervention: Prevent Infection  Description: Maintain skin and mucous membrane integrity; promote hand, oral and pulmonary hygiene.  Optimize fluid balance, nutrition, sleep and glycemic control to maximize infection resistance.  Identify potential sources of infection early to prevent or mitigate progression of infection (e.g.,  wound, lines, devices).  Evaluate ongoing need for invasive devices; remove promptly when no longer indicated.  Recent Flowsheet Documentation  Taken 11/19/2021 1823 by Lesa King RN  Infection Prevention: personal protective equipment utilized  Taken 11/19/2021 1720 by Lesa King RN  Infection Prevention: personal protective equipment utilized  Taken 11/19/2021 1632 by Lesa King RN  Infection Prevention: personal protective equipment utilized  Taken 11/19/2021 1515 by Lesa King RN  Infection Prevention: personal protective equipment utilized  Taken 11/19/2021 1414 by Lesa King RN  Infection Prevention: personal protective equipment utilized  Taken 11/19/2021 1315 by Lesa King RN  Infection Prevention: personal protective equipment utilized  Taken 11/19/2021 1220 by Lesa King RN  Infection Prevention: personal protective equipment utilized  Taken 11/19/2021 1121 by Lesa King RN  Infection Prevention: personal protective equipment utilized  Taken 11/19/2021 1022 by Lesa King RN  Infection Prevention: personal protective equipment utilized  Taken 11/19/2021 0930 by Lesa King RN  Infection Prevention: personal protective equipment utilized  Taken 11/19/2021 0807 by Lesa King RN  Infection Prevention: personal protective equipment utilized  Taken 11/19/2021 0745 by Lesa King RN  Infection Prevention: personal protective equipment utilized  Goal: Optimal Comfort and Wellbeing  Outcome: Ongoing, Progressing  Intervention: Provide Person-Centered Care  Description: Use a family-focused approach to care.  Develop trust and rapport by proactively providing information, encouraging questions, addressing concerns and offering reassurance.  Acknowledge emotional response to hospitalization.  Recognize and utilize personal coping strategies.  Honor spiritual and cultural preferences.  Recent Flowsheet Documentation  Taken 11/19/2021 1632 by Lesa King  RN  Trust Relationship/Rapport:   care explained   choices provided   questions answered   questions encouraged   thoughts/feelings acknowledged   reassurance provided  Taken 11/19/2021 0745 by Lesa King RN  Trust Relationship/Rapport:   questions answered   questions encouraged   thoughts/feelings acknowledged   care explained  Goal: Readiness for Transition of Care  Outcome: Ongoing, Progressing     Problem: UTI (Urinary Tract Infection)  Goal: Improved Infection Symptoms  Outcome: Ongoing, Progressing  Intervention: Prevent and Manage Infection Progression  Description: Promptly administer antibiotics after obtaining specimens for culture (e.g., urine, blood, vaginal); anticipate potential change in agent following completion of culture and sensitivity.  Provide pharmacologic comfort measures such as non-teratogenic analgesic, anti-spasmodic, antiemetic; monitor efficacy and side effects.  Ensure adequate fluid intake to increase or maintain urine production.  Provide or promote daily hygiene and urethral meatal cleansing during bath and after bowel movements, especially when gravid abdomen precludes meticulous hygiene.  Identify and manage signs of early sepsis.  Anticipate and prepare for advanced diagnostic procedures based on risk and presentation (e.g., chest x-ray, renal sonography, abdominal or kidney radiographs).  Monitor for uterine contraction activity and fetal heart rate based on risk and presentation.  Recent Flowsheet Documentation  Taken 11/19/2021 1632 by Lesa King RN  Infection Management: aseptic technique maintained  Fetal Wellbeing Promotion: fetal heart tones checked  Taken 11/19/2021 0745 by Lesa King RN  Infection Management: aseptic technique maintained   Goal Outcome Evaluation:  Plan of Care Reviewed With: patient, spouse        Progress: improving

## 2021-11-20 NOTE — PROGRESS NOTES
11/20/2021  APU Progress Note    Subjective   Radha feels better today. Back pain is controlled with tylenol. She requests discharge home, was kept yesterday due to worsening pain.       Objective   Temp: Temp:  [97.8 °F (36.6 °C)-98.6 °F (37 °C)] 98.4 °F (36.9 °C) Temp src: Oral   BP: BP: ()/(50-59) 102/59        Pulse: Heart Rate:  [81-90] 90  RR: Resp:  [16-18] 16    General:  No acute distress   Abdomen: Soft    Pelvis: deferred     Lab Results   Component Value Date    WBC 12.06 (H) 09/08/2021    HGB 13.5 09/08/2021    HCT 41.3 09/08/2021    MCV 93.9 09/08/2021     09/08/2021    HEPBSAG Non-Reactive 09/08/2021       Assessment  1. IUP @ 18w2d  2. Pyelonephritis    Plan  1. S/p rocephin 1g IV x 2 doses  2. Discharge home on macrobid BID x 7 days followed by daily prophylaxis      This note has been electronically signed.    Cierra Gordon MD  12:13 EST  November 20, 2021

## 2021-11-21 LAB — BACTERIA SPEC AEROBE CULT: ABNORMAL

## 2022-02-01 ENCOUNTER — TRANSCRIBE ORDERS (OUTPATIENT)
Dept: LAB | Facility: HOSPITAL | Age: 22
End: 2022-02-01

## 2022-02-01 ENCOUNTER — LAB (OUTPATIENT)
Dept: LAB | Facility: HOSPITAL | Age: 22
End: 2022-02-01

## 2022-02-01 DIAGNOSIS — Z3A.28 28 WEEKS GESTATION OF PREGNANCY: ICD-10-CM

## 2022-02-01 DIAGNOSIS — Z34.83 PRENATAL CARE, SUBSEQUENT PREGNANCY, THIRD TRIMESTER: Primary | ICD-10-CM

## 2022-02-01 DIAGNOSIS — Z34.83 PRENATAL CARE, SUBSEQUENT PREGNANCY, THIRD TRIMESTER: ICD-10-CM

## 2022-02-01 LAB
BLD GP AB SCN SERPL QL: NEGATIVE
GLUCOSE 1H P 100 G GLC PO SERPL-MCNC: 63 MG/DL (ref 65–139)

## 2022-02-01 PROCEDURE — 86850 RBC ANTIBODY SCREEN: CPT

## 2022-02-01 PROCEDURE — 85027 COMPLETE CBC AUTOMATED: CPT

## 2022-02-01 PROCEDURE — 36415 COLL VENOUS BLD VENIPUNCTURE: CPT

## 2022-02-01 PROCEDURE — 82950 GLUCOSE TEST: CPT

## 2022-02-02 LAB
DEPRECATED RDW RBC AUTO: 42.5 FL (ref 37–54)
ERYTHROCYTE [DISTWIDTH] IN BLOOD BY AUTOMATED COUNT: 12.4 % (ref 12.3–15.4)
HCT VFR BLD AUTO: 35.8 % (ref 34–46.6)
HGB BLD-MCNC: 11.9 G/DL (ref 12–15.9)
MCH RBC QN AUTO: 31.3 PG (ref 26.6–33)
MCHC RBC AUTO-ENTMCNC: 33.2 G/DL (ref 31.5–35.7)
MCV RBC AUTO: 94.2 FL (ref 79–97)
PLATELET # BLD AUTO: 171 10*3/MM3 (ref 140–450)
PMV BLD AUTO: 12.2 FL (ref 6–12)
RBC # BLD AUTO: 3.8 10*6/MM3 (ref 3.77–5.28)
WBC NRBC COR # BLD: 9.89 10*3/MM3 (ref 3.4–10.8)

## 2022-04-17 ENCOUNTER — HOSPITAL ENCOUNTER (INPATIENT)
Facility: HOSPITAL | Age: 22
LOS: 1 days | Discharge: HOME | End: 2022-04-18
Attending: ADVANCED PRACTICE MIDWIFE | Admitting: OBSTETRICS & GYNECOLOGY

## 2022-04-17 ENCOUNTER — HOSPITAL ENCOUNTER (EMERGENCY)
Facility: HOSPITAL | Age: 22
Discharge: STILL ADMITTED OR ADMINISTRATIVE | End: 2022-04-17
Attending: EMERGENCY MEDICINE

## 2022-04-17 ENCOUNTER — APPOINTMENT (OUTPATIENT)
Dept: PREADMISSION TESTING | Facility: HOSPITAL | Age: 22
End: 2022-04-17

## 2022-04-17 PROBLEM — Z37.9 NORMAL LABOR: Status: ACTIVE | Noted: 2022-04-17

## 2022-04-17 LAB
ABO GROUP BLD: NORMAL
BLD GP AB SCN SERPL QL: NEGATIVE
DEPRECATED RDW RBC AUTO: 39.9 FL (ref 37–54)
ERYTHROCYTE [DISTWIDTH] IN BLOOD BY AUTOMATED COUNT: 11.8 % (ref 12.3–15.4)
HCT VFR BLD AUTO: 35.7 % (ref 34–46.6)
HGB BLD-MCNC: 11.9 G/DL (ref 12–15.9)
MCH RBC QN AUTO: 31.7 PG (ref 26.6–33)
MCHC RBC AUTO-ENTMCNC: 33.3 G/DL (ref 31.5–35.7)
MCV RBC AUTO: 95.2 FL (ref 79–97)
PLATELET # BLD AUTO: 140 10*3/MM3 (ref 140–450)
PMV BLD AUTO: 13 FL (ref 6–12)
RBC # BLD AUTO: 3.75 10*6/MM3 (ref 3.77–5.28)
RH BLD: POSITIVE
SARS-COV-2 RDRP RESP QL NAA+PROBE: NORMAL
T&S EXPIRATION DATE: NORMAL
WBC NRBC COR # BLD: 14.61 10*3/MM3 (ref 3.4–10.8)

## 2022-04-17 PROCEDURE — 85027 COMPLETE CBC AUTOMATED: CPT | Performed by: OBSTETRICS & GYNECOLOGY

## 2022-04-17 PROCEDURE — 86901 BLOOD TYPING SEROLOGIC RH(D): CPT | Performed by: OBSTETRICS & GYNECOLOGY

## 2022-04-17 PROCEDURE — 87635 SARS-COV-2 COVID-19 AMP PRB: CPT | Performed by: OBSTETRICS & GYNECOLOGY

## 2022-04-17 PROCEDURE — 86850 RBC ANTIBODY SCREEN: CPT | Performed by: OBSTETRICS & GYNECOLOGY

## 2022-04-17 PROCEDURE — 86900 BLOOD TYPING SEROLOGIC ABO: CPT | Performed by: OBSTETRICS & GYNECOLOGY

## 2022-04-17 PROCEDURE — 99284 EMERGENCY DEPT VISIT MOD MDM: CPT

## 2022-04-17 RX ORDER — ERYTHROMYCIN 5 MG/G
OINTMENT OPHTHALMIC
Status: DISCONTINUED
Start: 2022-04-17 | End: 2022-04-18 | Stop reason: HOSPADM

## 2022-04-17 RX ORDER — DOCUSATE SODIUM 100 MG/1
100 CAPSULE, LIQUID FILLED ORAL 2 TIMES DAILY
Status: DISCONTINUED | OUTPATIENT
Start: 2022-04-17 | End: 2022-04-18 | Stop reason: HOSPADM

## 2022-04-17 RX ORDER — OXYTOCIN/0.9 % SODIUM CHLORIDE 30/500 ML
PLASTIC BAG, INJECTION (ML) INTRAVENOUS
Status: DISCONTINUED
Start: 2022-04-17 | End: 2022-04-18 | Stop reason: HOSPADM

## 2022-04-17 RX ORDER — BISACODYL 10 MG
10 SUPPOSITORY, RECTAL RECTAL DAILY PRN
Status: DISCONTINUED | OUTPATIENT
Start: 2022-04-18 | End: 2022-04-18 | Stop reason: HOSPADM

## 2022-04-17 RX ORDER — HYDROCORTISONE 25 MG/G
1 CREAM TOPICAL AS NEEDED
Status: DISCONTINUED | OUTPATIENT
Start: 2022-04-17 | End: 2022-04-18 | Stop reason: HOSPADM

## 2022-04-17 RX ORDER — IBUPROFEN 600 MG/1
600 TABLET ORAL EVERY 6 HOURS PRN
Status: DISCONTINUED | OUTPATIENT
Start: 2022-04-17 | End: 2022-04-18 | Stop reason: HOSPADM

## 2022-04-17 RX ORDER — IBUPROFEN 600 MG/1
600 TABLET ORAL EVERY 4 HOURS PRN
Status: DISCONTINUED | OUTPATIENT
Start: 2022-04-17 | End: 2022-04-18 | Stop reason: HOSPADM

## 2022-04-17 RX ORDER — SODIUM CHLORIDE 0.9 % (FLUSH) 0.9 %
1-10 SYRINGE (ML) INJECTION AS NEEDED
Status: DISCONTINUED | OUTPATIENT
Start: 2022-04-17 | End: 2022-04-18 | Stop reason: HOSPADM

## 2022-04-17 RX ORDER — ACETAMINOPHEN 500 MG
1000 TABLET ORAL EVERY 6 HOURS PRN
Status: DISCONTINUED | OUTPATIENT
Start: 2022-04-17 | End: 2022-04-18 | Stop reason: HOSPADM

## 2022-04-17 RX ADMIN — IBUPROFEN 600 MG: 600 TABLET ORAL at 10:27

## 2022-04-17 RX ADMIN — Medication 1 APPLICATION: at 06:10

## 2022-04-17 RX ADMIN — DOXYLAMINE SUCCINATE 25 MG: 25 TABLET ORAL at 23:27

## 2022-04-17 RX ADMIN — IBUPROFEN 600 MG: 600 TABLET ORAL at 05:01

## 2022-04-17 RX ADMIN — IBUPROFEN 600 MG: 600 TABLET ORAL at 23:27

## 2022-04-17 RX ADMIN — Medication: at 06:10

## 2022-04-17 RX ADMIN — IBUPROFEN 600 MG: 600 TABLET ORAL at 16:28

## 2022-04-17 RX ADMIN — DOCUSATE SODIUM 100 MG: 100 CAPSULE, LIQUID FILLED ORAL at 10:27

## 2022-04-17 RX ADMIN — ACETAMINOPHEN 1000 MG: 500 TABLET ORAL at 21:03

## 2022-04-17 RX ADMIN — WITCH HAZEL 1 PAD: 500 SOLUTION RECTAL; TOPICAL at 06:10

## 2022-04-17 RX ADMIN — ACETAMINOPHEN 1000 MG: 500 TABLET ORAL at 15:03

## 2022-04-17 RX ADMIN — DOCUSATE SODIUM 100 MG: 100 CAPSULE, LIQUID FILLED ORAL at 21:03

## 2022-04-17 NOTE — L&D DELIVERY NOTE
[facility]  Vaginal Delivery Note   Review the Delivery Report for details.       Delivery     Delivery: Vaginal Delivery, unspecified     YOB: 2024    Time of Birth:  Gestational Age 3:55 AM   39w3d     Anesthesia:      Delivering clinician:  pt delivered her own baby in ER   Forceps?   No   Vacuum? No    Shoulder dystocia present: No        Delivery narrative:  Pt delivered her baby in ER.  Placenta still not delivered here, but delivered intact by me after arrival here.  Small bilat labial lacerations only (first degree) left unrepaired    Infant    Findings: male  infant     Infant observations: Weight: 3185 g (7 lb 0.4 oz)   Length: 19.5  in  Observations/Comments:        Apgars:   @ 1 minute /      @ 5 minutes   Infant Name:      Placenta, Cord, and Fluid    Placenta delivered  Spontaneous  at   4/17/2022  4:26 AM     Cord:   present.   Nuchal Cord?  no   Cord blood obtained:     Cord gases obtained:      Cord gas results: Venous:  No results found for: PHCVEN    Arterial:  No results found for: PHCART     Repair    Episiotomy: Not recorded     No    Lacerations: Yes  Laceration Information  Laceration Repaired?   Perineal:       Periurethral:       Labial: bilateral  No    Sulcus:       Vaginal:       Cervical:            Estimated Blood Loss:  <100ml             Quantitative Blood Loss:                  Complications  none    Disposition  Mother to Mother Baby/Postpartum  in stable condition currently.  Baby to remains with mom  in stable condition currently.      Johan Ortega MD  04/17/22  04:37 EDT

## 2022-04-17 NOTE — H&P
Radha Villanueva  2000  [id number]  [id number]    CC: delivered her baby in ER chair  HPI:  Patient is 22 y.o. female   presents after delivering her own baby in an ER chair.  Pt with onset contractions ~1hr prior to delivery.  PNC comp by pyelo at 18 weeks and appendicitis at 5 weeks.  Placenta has not delivered    PMH:  Current meds: PNV  Illnesses: pyelo  Surgeries: appy, bartholin cyst removal, T and A, oral surg  Allergies: NKDA    Past OB History:       OB History    Para Term  AB Living   3 1 1 0 1 1   SAB IAB Ectopic Molar Multiple Live Births   0 0 0 0 0 1      # Outcome Date GA Lbr Wilian/2nd Weight Sex Delivery Anes PTL Lv   3 Current            2 Term 19 38w3d 13:21 / 00:15 2860 g (6 lb 4.9 oz) F Vag-Spont EPI N TYLER      Name: CHUCKY VILLANUEVA      Apgar1: 9  Apgar5: 9   1 AB 18 7w0d    SAB               SH: tob neg , EtOH neg, drugs neg    Physical Examination: General appearance - alert, well appearing, and in no distress  Vaginal Exam: placenta delivered intact, bilat first degree labial tears ,external genitalia normal    Assessment 1)s/p  in ER, placenta now delivered    Plan 1)admit   2)[facility] care    Johan Ortega MD  2022  04:31 EDT

## 2022-04-17 NOTE — ED PROVIDER NOTES
[place]    EMERGENCY DEPARTMENT ENCOUNTER      Pt Name: Radha Villanueva  MRN: [id number]  YOB: 2000  Date of evaluation: 2022  Provider: Monster Trent DO    CHIEF COMPLAINT       Active labor    HISTORY OF PRESENT ILLNESS  (Location/Symptom, Timing/Onset, Context/Setting, Quality, Duration, Modifying Factors, Severity.)   Radha Villanueva is a 22 y.o. female who presents to the emergency department as brought in by private vehicle in active labor.  Patient was immediately brought back into her room, precipitous delivery occurred in the emergency department in ED wheelchair.  With limited questioning of the patient she was approximately 39 weeks 3 days, , unsure how long she had had contractions when her water broke prior to arrival, approx 1 hour.  Denies any complications with her pregnancy up until this point.  Limited HPI as the patient is currently delivering while she is brought back into the emergency department room.      Nursing notes were reviewed.    REVIEW OF SYSTEMS    (2-9 systems for level 4, 10 or more for level 5)   ROS:  Unable to fully assess review of systems as patient presents in active precipitous delivery      PAST MEDICAL HISTORY     Past Medical History:   Diagnosis Date   • Bartholin gland cyst 2017    removed   • Urinary tract infection          SURGICAL HISTORY       Past Surgical History:   Procedure Laterality Date   • APPENDECTOMY     • CYST REMOVAL      bartholin   • TONSILLECTOMY     • WISDOM TOOTH EXTRACTION           CURRENT MEDICATIONS     No current facility-administered medications for this encounter.  No current outpatient medications on file.    Facility-Administered Medications Ordered in Other Encounters:   •  erythromycin (ROMYCIN) 5 MG/GM ophthalmic ointment  - ADS Override Pull, , , ,   •  ibuprofen (ADVIL,MOTRIN) tablet 600 mg, 600 mg, Oral, Q4H PRN, Johan Ortega MD, 600 mg at 22 0501  •  Oxytocin-Sodium Chloride (PITOCIN)  30-0.9 UT/500ML-% infusion solution  - ADS Override Pull, , , ,   •  Oxytocin-Sodium Chloride (PITOCIN) 30-0.9 UT/500ML-% infusion solution  - ADS Override Pull, , , ,     ALLERGIES     Patient has no known allergies.    FAMILY HISTORY       Family History   Problem Relation Age of Onset   • No Known Problems Mother    • No Known Problems Father    • Cancer Maternal Grandmother         Unsure what kind   • No Known Problems Maternal Grandfather    • No Known Problems Paternal Grandmother    • No Known Problems Paternal Grandfather           SOCIAL HISTORY       Social History     Socioeconomic History   • Marital status:    Tobacco Use   • Smoking status: Never Smoker   • Smokeless tobacco: Never Used   Substance and Sexual Activity   • Alcohol use: No   • Drug use: No   • Sexual activity: Yes     Partners: Male     Birth control/protection: None         PHYSICAL EXAM    (up to 7 for level 4, 8 or more for level 5)   There were no vitals filed for this visit.    Physical Exam  General : Patient is awake, alert, oriented, in active labor  HEENT: Conjunctivae clear, sclerae white  Neck: Neck is supple  Cardiac: Heart tachycardic rate  Lungs: Lungs a with no acute respiratory distress  Abdomen: Abdomen is soft, gravid abdomen, in active labor.  Musculoskeletal: No muscle atrophy.  Neuro: Awake and alert  Dermatology: Skin is warm and dry        DIAGNOSTIC RESULTS     EKG: All EKGs are interpreted by the Emergency Department Physician who either signs or Co-signs this chart in the absence of a cardiologist.    No orders to display       RADIOLOGY:   Non-plain film images such as CT, Ultrasound and MRI are read by the radiologist. Plain radiographic images are visualized and preliminarily interpreted by the emergency physician with the below findings:      [] Radiologist's Report Reviewed:  No orders to display         ED BEDSIDE ULTRASOUND:   Performed by ED Physician - none    LABS:    I have reviewed and  interpreted all of the currently available lab results from this visit (if applicable):  Results for orders placed or performed during the hospital encounter of 22   CBC (No Diff)    Specimen: Blood   Result Value Ref Range    WBC 14.61 (H) 3.40 - 10.80 10*3/mm3    RBC 3.75 (L) 3.77 - 5.28 10*6/mm3    Hemoglobin 11.9 (L) 12.0 - 15.9 g/dL    Hematocrit 35.7 34.0 - 46.6 %    MCV 95.2 79.0 - 97.0 fL    MCH 31.7 26.6 - 33.0 pg    MCHC 33.3 31.5 - 35.7 g/dL    RDW 11.8 (L) 12.3 - 15.4 %    RDW-SD 39.9 37.0 - 54.0 fl    MPV 13.0 (H) 6.0 - 12.0 fL    Platelets 140 140 - 450 10*3/mm3        All other labs were within normal range or not returned as of this dictation.      EMERGENCY DEPARTMENT COURSE and DIFFERENTIAL DIAGNOSIS/MDM:   Vitals:  There were no vitals filed for this visit.     APGAR Score from MDCalc.com  on 2022  ** All calculations should be rechecked by clinician prior to use **    RESULT SUMMARY:  8 points  Scores ? 7 are typically “normal” for neonates      INPUTS:  Activity/muscle tone --> 2 = Active  Pulse --> 2 = ?100 BPM  Grimace --> 1 = Grimace  Appearance/color --> 1 = Blue extremities, pink body  Respirations --> 2 = Good/crying      Patient presents to the emergency department in active labor.  She is wheeled back into her ED room the patient is actively precipitous delivering the baby.  Baby boy is delivered by myself in the ED room, cord is clamped and cut, baby is stimulated dried, suctioned.  Time of delivery 3:55 AM,  2022.  IV was placed and the mother, OB/GYN team down to the emergency department.  The child is actively crying, continued with stimulation warming methods.  Heart rates 120 bpm on my initial evaluation.  Initial Apgar score of 8.  Mother did well with the precipitous delivery.  She was moved to a ED bed, immediately transported up with baby to labor and delivery for delivery of her placenta.  Baby was with mother, father also at bedside, child remained with  good Apgar scores.  Transferred up to labor and delivery floor.            MEDICATIONS ADMINISTERED IN ED:  Medications - No data to display    PROCEDURES:  Procedures   Precipitous delivery: Baby boy is precipitously delivered in the emergency department, not breech, head first, child is initially cleaned with warm blankets, good cry, umbilical cord is clamped about 4 cm from the umbilical stump x2, cord is cut, baby is crying well.  Apgar score of 8 initially.  Time of delivery 3:55 AM, 2022.  Minimal blood loss.        CRITICAL CARE TIME    Total Critical Care time was 0 minutes, excluding separately reportable procedures.   There was a high probability of clinically significant/life threatening deterioration in the patient's condition which required my urgent intervention.      FINAL IMPRESSION      1. Spontaneous vaginal delivery    2. Precipitous delivery          DISPOSITION/PLAN     ED Disposition     ED Disposition   Send to L&D    Condition   --    Comment   --                   Comment: Please note this report has been produced using speech recognition software.      Monster Trent DO  Attending Emergency Physician               Monster Trent DO  22 0438       Monster Trent DO  22 0559

## 2022-04-17 NOTE — LACTATION NOTE
04/17/22 1530   Maternal Information   Person Making Referral lactation consultant   Maternal Reason for Referral   ( 1st baby x1 year; had increased milk supply)   Infant Reason for Referral   (baby isabella fed since delivery--a little pinchy)   Maternal Assessment   Breast Size Issue none   Breast Shape Bilateral:;round   Breast Density Bilateral:;soft   Nipples Bilateral:;graspable   Left Nipple Symptoms intact;tender;redness   Right Nipple Symptoms intact;tender;redness   Maternal Infant Feeding   Maternal Emotional State receptive;tense   Infant Positioning clutch/football   Signs of Milk Transfer deep jaw excursions noted   Pain with Feeding   (less pain with deeper latch)   Comfort Measures Before/During Feeding infant position adjusted;latch adjusted;maternal position adjusted   Latch Assistance minimal assistance   Milk Expression/Equipment   Breast Pump Type double electric, personal  (mom has personal pump at home)   Demonstrated position and latch--mom will work on these things. Encouraged as much skin to skin as possible. Teaching done as documented under Education. To call lactation services, if there are questions or concerns.     Mom had over supply--discussed limiting pumping to avoid oversupply. But may need to pump a little to avoid plugged ducts and/or breast infection.

## 2022-04-17 NOTE — PAYOR COMM NOTE
"Maxim Espino (22 y.o. Female)             Date of Birth   2000    Social Security Number       Address   [facility] [address] [facility]    Home Phone   [phone]    MRN   [id number]       Rastafarian   None    Marital Status                               Admission Date   22    Admission Type   Elective    Admitting Provider   Em Hutchison MD    Attending Provider   Elizabeth Yanez CNM    Department, Room/Bed   [facility] MOTHER BABY 4A, N409/1       Discharge Date       Discharge Disposition       Discharge Destination                               Attending Provider: Elizabeth Yanez CNM    Allergies: No Known Allergies    Isolation: None   Infection: None   Code Status: Prior   Advance Care Planning Activity    Ht: 170.2 cm (67\")   Wt: --    Admission Cmt: None   Principal Problem: None                Active Insurance as of 2022     Primary Coverage     Payor Plan Insurance Group Employer/Plan Group    [employer] [place] [employer] [place]      Payor Plan Address Payor Plan Phone Number Payor Plan Fax Number Effective Dates    [address] [address] [phone]  2017 - None Entered    [facility] [id number]       Subscriber Name Subscriber Birth Date Member ID       MAXIM ESPINO 2000 [id number]                 Emergency Contacts      (Rel.) Home Phone Work Phone Mobile Phone    Aki Espino (Spouse) [phone] -- --            Insurance Information                [facility]/[employer] MEDICAID Phone: [phone]    Subscriber: NicolaanjuMaxim Subscriber#: [id number]    Group#: -- Precert#: --            Physician Progress Notes (last 48 hours)  Notes from 04/15/22 0550 through 22 0550   No notes of this type exist for this encounter.     Vaginal delivery   Male infant - to  nursery  Weight 3185 gram  Gestation 39w3d  No apgars recorded  Deliveryed emergently in ED  "

## 2022-04-17 NOTE — PLAN OF CARE
Problem: Breastfeeding  Goal: Effective Breastfeeding  Outcome: Ongoing, Progressing  Intervention: Support Exclusive Breastfeeding Success  Flowsheets (Taken 4/17/2022 1530)  Supportive Measures: active listening utilized  Breastfeeding Support:   diary/feeding log utilized   encouragement provided   lactation counseling provided  Intervention: Promote Effective Breastfeeding  Flowsheets (Taken 4/17/2022 1530)  Breastfeeding Assistance:   feeding cue recognition promoted   feeding on demand promoted   support offered   assisted with positioning   hand expression verified   infant stimulated to wakeful state  Parent/Child Attachment Promotion:   caring behavior modeled   cue recognition promoted   face-to-face positioning promoted   participation in care promoted   skin-to-skin contact encouraged   strengths emphasized   positive reinforcement provided   Goal Outcome Evaluation:

## 2022-04-18 VITALS
SYSTOLIC BLOOD PRESSURE: 107 MMHG | TEMPERATURE: 98.1 F | RESPIRATION RATE: 16 BRPM | HEART RATE: 75 BPM | DIASTOLIC BLOOD PRESSURE: 51 MMHG

## 2022-04-18 RX ADMIN — IBUPROFEN 600 MG: 600 TABLET ORAL at 06:05

## 2022-04-18 RX ADMIN — IBUPROFEN 600 MG: 600 TABLET ORAL at 11:35

## 2022-04-18 RX ADMIN — ACETAMINOPHEN 1000 MG: 500 TABLET ORAL at 03:21

## 2022-04-18 RX ADMIN — ACETAMINOPHEN 1000 MG: 500 TABLET ORAL at 15:24

## 2022-04-18 RX ADMIN — DOCUSATE SODIUM 100 MG: 100 CAPSULE, LIQUID FILLED ORAL at 15:24

## 2022-04-18 NOTE — PAYOR COMM NOTE
"Maxim Espino (22 y.o. Female)     Auth#[id number]    Discharged 4/18/22 with Baby.    From:Patti Chow LPN, Utilization Review  Phone #[phone]  Fax #[phone]              Date of Birth   2000    Social Security Number       Address   [facility] [address] [facility]    Home Phone   [phone]    MRN   [id number]       Jehovah's witness   None    Marital Status                               Admission Date   4/17/22    Admission Type   Elective    Admitting Provider   Em Hutchison MD    Attending Provider       Department, Room/Bed   [facility] MOTHER BABY 4A, N409/1       Discharge Date   4/18/2022    Discharge Disposition   Home or Self Care    Discharge Destination                               Attending Provider: (none)   Allergies: No Known Allergies    Isolation: None   Infection: None   Code Status: CPR   Advance Care Planning Activity    Ht: 170.2 cm (67\")   Wt: --    Admission Cmt: None   Principal Problem: None                Active Insurance as of 4/17/2022     Primary Coverage     Payor Plan Insurance Group Employer/Plan Group    [employer] [place] [employer] [place]ICA[place]      Payor Plan Address Payor Plan Phone Number Payor Plan Fax Number Effective Dates    [address] [address] [phone]  12/29/2017 - None Entered    [facility] [id number]       Subscriber Name Subscriber Birth Date Member ID       MAXIM ESPINO 2000 [id number]                 Emergency Contacts      (Rel.) Home Phone Work Phone Mobile Phone    Aki Espino (Spouse) [phone] -- --               Operative/Procedure Notes (last 7 days)      Johan Ortega MD at 04/17/22 0437          [facility]  Vaginal Delivery Note   Review the Delivery Report for details.       Delivery     Delivery: Vaginal Delivery, unspecified     YOB: 2024    Time of Birth:  Gestational Age 3:55 AM   39w3d     Anesthesia:      Delivering clinician:  pt delivered her own baby in ER "   Forceps?   No   Vacuum? No    Shoulder dystocia present: No        Delivery narrative:  Pt delivered her baby in ER.  Placenta still not delivered here, but delivered intact by me after arrival here.  Small bilat labial lacerations only (first degree) left unrepaired    Infant    Findings: male  infant     Infant observations: Weight: 3185 g (7 lb 0.4 oz)   Length: 19.5  in  Observations/Comments:        Apgars:   @ 1 minute /      @ 5 minutes   Infant Name:      Placenta, Cord, and Fluid    Placenta delivered  Spontaneous  at   4/17/2022  4:26 AM     Cord:   present.   Nuchal Cord?  no   Cord blood obtained:     Cord gases obtained:      Cord gas results: Venous:  No results found for: PHCVEN    Arterial:  No results found for: PHCART     Repair    Episiotomy: Not recorded     No    Lacerations: Yes  Laceration Information  Laceration Repaired?   Perineal:       Periurethral:       Labial: bilateral  No    Sulcus:       Vaginal:       Cervical:            Estimated Blood Loss:  <100ml             Quantitative Blood Loss:                  Complications  none    Disposition  Mother to Mother Baby/Postpartum  in stable condition currently.  Baby to remains with mom  in stable condition currently.      Johan Ortega MD  04/17/22  04:37 EDT        Electronically signed by Johan Ortega MD at 04/17/22 0439          Discharge Summary      Jeimy Green CNM at 04/18/22 1300     Attestation signed by Cierra Gordon MD at 04/18/22 1302    I have reviewed this documentation and agree.                  [facility]  Vaginal Delivery Discharge Summary      Patient: Radha Villanueva      MR#:[id number]  Admission  Diagnosis: Term Pregnancy  Discharge Diagnosis: Vaginal delivery    Date of Admission: 4/17/2022  Date of Discharge:  4/18/2022    Procedures:  Vaginal, Spontaneous     4/17/2022    3:55 AM      Service:  Obstetrics    Hospital Course:  Patient underwent vaginal delivery and remained in the hospital for  1 days.  During that time she remained afebrile and hemodynamically stable.  On the day of discharge, she was eating, ambulating and voiding without difficulty.      Lab Results   Component Value Date    WBC 14.61 (H) 04/17/2022    HGB 11.9 (L) 04/17/2022    HCT 35.7 04/17/2022    MCV 95.2 04/17/2022     04/17/2022     Results from last 7 days   Lab Units 04/17/22  0450   ABO TYPING  O   RH TYPING  Positive   ANTIBODY SCREEN  Negative       Discharge Medications     Discharge Medications      Continue These Medications      Instructions Start Date   acetaminophen 500 MG tablet  Commonly known as: TYLENOL   1,000 mg, Oral, Every 6 Hours PRN      docusate sodium 100 MG capsule   100 mg, Oral, 2 Times Daily PRN      doxylamine 25 MG tablet  Commonly known as: UNISOM   25 mg, Oral, Nightly PRN      Prenatal 27-1 27-1 MG tablet tablet   Oral, Daily             Discharge Disposition:  To Home    Discharge Condition:  Stable    Discharge Diet: regular    Activity at Discharge: Pelvic rest    Follow-up Appointments  6 weeks      Jeimy Green CNM  04/18/22  13:00 EDT    Electronically signed by Cierra Gordon MD at 04/18/22 2717

## 2022-04-18 NOTE — LACTATION NOTE
Mom reports baby is latching and nursing well.  Mom requested shield for home use in case she gets sore.  Medium shield given.  Nipples intact.

## 2022-04-18 NOTE — DISCHARGE SUMMARY
[facility]  Vaginal Delivery Discharge Summary      Patient: Radha Villanueva      MR#:[id number]  Admission  Diagnosis: Term Pregnancy  Discharge Diagnosis: Vaginal delivery    Date of Admission: 4/17/2022  Date of Discharge:  4/18/2022    Procedures:  Vaginal, Spontaneous     4/17/2022    3:55 AM      Service:  Obstetrics    Hospital Course:  Patient underwent vaginal delivery and remained in the hospital for 1 days.  During that time she remained afebrile and hemodynamically stable.  On the day of discharge, she was eating, ambulating and voiding without difficulty.      Lab Results   Component Value Date    WBC 14.61 (H) 04/17/2022    HGB 11.9 (L) 04/17/2022    HCT 35.7 04/17/2022    MCV 95.2 04/17/2022     04/17/2022     Results from last 7 days   Lab Units 04/17/22  0450   ABO TYPING  O   RH TYPING  Positive   ANTIBODY SCREEN  Negative       Discharge Medications     Discharge Medications      Continue These Medications      Instructions Start Date   acetaminophen 500 MG tablet  Commonly known as: TYLENOL   1,000 mg, Oral, Every 6 Hours PRN      docusate sodium 100 MG capsule   100 mg, Oral, 2 Times Daily PRN      doxylamine 25 MG tablet  Commonly known as: UNISOM   25 mg, Oral, Nightly PRN      Prenatal 27-1 27-1 MG tablet tablet   Oral, Daily             Discharge Disposition:  To Home    Discharge Condition:  Stable    Discharge Diet: regular    Activity at Discharge: Pelvic rest    Follow-up Appointments  6 weeks      Jeimy Green CNM  04/18/22  13:00 EDT

## 2022-04-18 NOTE — PROGRESS NOTES
[facility]  Vaginal Delivery Progress Note    Subjective     Doing well, pain controlled, lochia less than menses. Desires early discharge home    Objective     Vital Signs Range for the last 24 hours  Temperature: Temp:  [97.8 °F (36.6 °C)-98.2 °F (36.8 °C)] 98.1 °F (36.7 °C)   Temp Source: Temp src: Oral   BP: BP: (107-122)/(51-71) 107/51   Pulse: Heart Rate:  [74-76] 75   Respirations: Resp:  [16] 16   SPO2:     O2 Amount (l/min):     O2 Devices           Physical Exam:  General:  no acute distresss.  Abdomen: Soft, non-tender, fundus firm  Extremities: normal, atraumatic, no cyanosis, and trace edema.       Lab results reviewed:  Yes    Lab Results   Component Value Date    WBC 14.61 (H) 04/17/2022    HGB 11.9 (L) 04/17/2022    HCT 35.7 04/17/2022    MCV 95.2 04/17/2022     04/17/2022         Assessment/Plan       Normal labor      Radha Villanueva is Day 1  post-partum       Plan:  Discharge home with standard precautions and return to clinic in 4-6 weeks.      Jeimy Green CNM  4/18/2022  12:58 EDT

## 2023-02-28 ENCOUNTER — LAB (OUTPATIENT)
Dept: LAB | Facility: HOSPITAL | Age: 23
End: 2023-02-28
Payer: COMMERCIAL

## 2023-02-28 ENCOUNTER — TRANSCRIBE ORDERS (OUTPATIENT)
Dept: LAB | Facility: HOSPITAL | Age: 23
End: 2023-02-28
Payer: COMMERCIAL

## 2023-02-28 DIAGNOSIS — Z3A.13 13 WEEKS GESTATION OF PREGNANCY: ICD-10-CM

## 2023-02-28 DIAGNOSIS — Z34.92 SECOND TRIMESTER PREGNANCY: Primary | ICD-10-CM

## 2023-02-28 LAB
ABO GROUP BLD: NORMAL
AMPHET+METHAMPHET UR QL: NEGATIVE
AMPHETAMINES UR QL: NEGATIVE
BACTERIA UR QL AUTO: ABNORMAL /HPF
BARBITURATES UR QL SCN: NEGATIVE
BENZODIAZ UR QL SCN: NEGATIVE
BILIRUB UR QL STRIP: NEGATIVE
BLD GP AB SCN SERPL QL: NEGATIVE
BUPRENORPHINE SERPL-MCNC: NEGATIVE NG/ML
CANNABINOIDS SERPL QL: NEGATIVE
CLARITY UR: CLEAR
COCAINE UR QL: NEGATIVE
COLOR UR: YELLOW
DEPRECATED RDW RBC AUTO: 40.7 FL (ref 37–54)
ERYTHROCYTE [DISTWIDTH] IN BLOOD BY AUTOMATED COUNT: 12.1 % (ref 12.3–15.4)
GLUCOSE SERPL-MCNC: 65 MG/DL (ref 65–99)
GLUCOSE UR STRIP-MCNC: NEGATIVE MG/DL
HBV SURFACE AG SERPL QL IA: NORMAL
HCT VFR BLD AUTO: 36.7 % (ref 34–46.6)
HCV AB SER DONR QL: NORMAL
HGB BLD-MCNC: 11.9 G/DL (ref 12–15.9)
HGB UR QL STRIP.AUTO: NEGATIVE
HIV1+2 AB SER QL: NORMAL
HYALINE CASTS UR QL AUTO: ABNORMAL /LPF
KETONES UR QL STRIP: NEGATIVE
LEUKOCYTE ESTERASE UR QL STRIP.AUTO: ABNORMAL
MCH RBC QN AUTO: 29.5 PG (ref 26.6–33)
MCHC RBC AUTO-ENTMCNC: 32.4 G/DL (ref 31.5–35.7)
MCV RBC AUTO: 91.1 FL (ref 79–97)
METHADONE UR QL SCN: NEGATIVE
NITRITE UR QL STRIP: NEGATIVE
OPIATES UR QL: POSITIVE
OXYCODONE UR QL SCN: NEGATIVE
PCP UR QL SCN: NEGATIVE
PH UR STRIP.AUTO: 6.5 [PH] (ref 5–8)
PLATELET # BLD AUTO: 203 10*3/MM3 (ref 140–450)
PMV BLD AUTO: 12.4 FL (ref 6–12)
PROPOXYPH UR QL: NEGATIVE
PROT UR QL STRIP: NEGATIVE
RBC # BLD AUTO: 4.03 10*6/MM3 (ref 3.77–5.28)
RBC # UR STRIP: ABNORMAL /HPF
REF LAB TEST METHOD: ABNORMAL
RH BLD: POSITIVE
SP GR UR STRIP: 1.02 (ref 1–1.03)
SQUAMOUS #/AREA URNS HPF: ABNORMAL /HPF
TRICYCLICS UR QL SCN: NEGATIVE
TSH SERPL DL<=0.05 MIU/L-ACNC: 1.44 UIU/ML (ref 0.27–4.2)
UROBILINOGEN UR QL STRIP: ABNORMAL
WBC # UR STRIP: ABNORMAL /HPF
WBC NRBC COR # BLD: 14.93 10*3/MM3 (ref 3.4–10.8)

## 2023-02-28 PROCEDURE — 81001 URINALYSIS AUTO W/SCOPE: CPT | Performed by: ADVANCED PRACTICE MIDWIFE

## 2023-02-28 PROCEDURE — 86762 RUBELLA ANTIBODY: CPT | Performed by: ADVANCED PRACTICE MIDWIFE

## 2023-02-28 PROCEDURE — G0432 EIA HIV-1/HIV-2 SCREEN: HCPCS | Performed by: ADVANCED PRACTICE MIDWIFE

## 2023-02-28 PROCEDURE — 86780 TREPONEMA PALLIDUM: CPT | Performed by: ADVANCED PRACTICE MIDWIFE

## 2023-02-28 PROCEDURE — 85027 COMPLETE CBC AUTOMATED: CPT | Performed by: ADVANCED PRACTICE MIDWIFE

## 2023-02-28 PROCEDURE — 86900 BLOOD TYPING SEROLOGIC ABO: CPT | Performed by: ADVANCED PRACTICE MIDWIFE

## 2023-02-28 PROCEDURE — 87340 HEPATITIS B SURFACE AG IA: CPT | Performed by: ADVANCED PRACTICE MIDWIFE

## 2023-02-28 PROCEDURE — 86803 HEPATITIS C AB TEST: CPT | Performed by: ADVANCED PRACTICE MIDWIFE

## 2023-02-28 PROCEDURE — 87086 URINE CULTURE/COLONY COUNT: CPT | Performed by: ADVANCED PRACTICE MIDWIFE

## 2023-02-28 PROCEDURE — 84443 ASSAY THYROID STIM HORMONE: CPT | Performed by: ADVANCED PRACTICE MIDWIFE

## 2023-02-28 PROCEDURE — 82947 ASSAY GLUCOSE BLOOD QUANT: CPT | Performed by: ADVANCED PRACTICE MIDWIFE

## 2023-02-28 PROCEDURE — 86850 RBC ANTIBODY SCREEN: CPT | Performed by: ADVANCED PRACTICE MIDWIFE

## 2023-02-28 PROCEDURE — 36415 COLL VENOUS BLD VENIPUNCTURE: CPT | Performed by: ADVANCED PRACTICE MIDWIFE

## 2023-02-28 PROCEDURE — 80306 DRUG TEST PRSMV INSTRMNT: CPT | Performed by: ADVANCED PRACTICE MIDWIFE

## 2023-02-28 PROCEDURE — 86901 BLOOD TYPING SEROLOGIC RH(D): CPT | Performed by: ADVANCED PRACTICE MIDWIFE

## 2023-03-01 LAB
BACTERIA SPEC AEROBE CULT: NO GROWTH
RUBV IGG SERPL IA-ACNC: 1.57 INDEX
TREPONEMA PALLIDUM IGG+IGM AB [PRESENCE] IN SERUM OR PLASMA BY IMMUNOASSAY: NON REACTIVE

## 2023-04-17 ENCOUNTER — TRANSCRIBE ORDERS (OUTPATIENT)
Dept: LAB | Facility: HOSPITAL | Age: 23
End: 2023-04-17
Payer: COMMERCIAL

## 2023-04-17 ENCOUNTER — LAB (OUTPATIENT)
Dept: LAB | Facility: HOSPITAL | Age: 23
End: 2023-04-17
Payer: COMMERCIAL

## 2023-04-17 DIAGNOSIS — Z34.92 SECOND TRIMESTER PREGNANCY: ICD-10-CM

## 2023-04-17 DIAGNOSIS — Z34.92 SECOND TRIMESTER PREGNANCY: Primary | ICD-10-CM

## 2023-04-17 LAB
AMPHET+METHAMPHET UR QL: NEGATIVE
AMPHETAMINES UR QL: NEGATIVE
BARBITURATES UR QL SCN: NEGATIVE
BENZODIAZ UR QL SCN: NEGATIVE
BUPRENORPHINE SERPL-MCNC: NEGATIVE NG/ML
CANNABINOIDS SERPL QL: NEGATIVE
COCAINE UR QL: NEGATIVE
METHADONE UR QL SCN: NEGATIVE
OPIATES UR QL: NEGATIVE
OXYCODONE UR QL SCN: NEGATIVE
PCP UR QL SCN: NEGATIVE
PROPOXYPH UR QL: NEGATIVE
TRICYCLICS UR QL SCN: NEGATIVE

## 2023-04-17 PROCEDURE — 80306 DRUG TEST PRSMV INSTRMNT: CPT

## 2023-04-19 ENCOUNTER — TRANSCRIBE ORDERS (OUTPATIENT)
Dept: OBSTETRICS AND GYNECOLOGY | Facility: HOSPITAL | Age: 23
End: 2023-04-19
Payer: COMMERCIAL

## 2023-04-19 DIAGNOSIS — O36.5920 MATERNAL CARE FOR OTHER KNOWN OR SUSPECTED POOR FETAL GROWTH, SECOND TRIMESTER, NOT APPLICABLE OR UNSPECIFIED: Primary | ICD-10-CM

## 2023-04-25 ENCOUNTER — HOSPITAL ENCOUNTER (OUTPATIENT)
Dept: WOMENS IMAGING | Facility: HOSPITAL | Age: 23
Discharge: HOME | End: 2023-04-25
Admitting: ADVANCED PRACTICE MIDWIFE
Payer: COMMERCIAL

## 2023-04-25 ENCOUNTER — OFFICE VISIT (OUTPATIENT)
Dept: OBSTETRICS AND GYNECOLOGY | Facility: HOSPITAL | Age: 23
End: 2023-04-25
Payer: COMMERCIAL

## 2023-04-25 VITALS
HEIGHT: 67 IN | BODY MASS INDEX: 22.51 KG/M2 | DIASTOLIC BLOOD PRESSURE: 72 MMHG | SYSTOLIC BLOOD PRESSURE: 124 MMHG | WEIGHT: 143.4 LBS

## 2023-04-25 DIAGNOSIS — Z34.90 PREGNANCY, UNSPECIFIED GESTATIONAL AGE: ICD-10-CM

## 2023-04-25 DIAGNOSIS — O36.5920 MATERNAL CARE FOR OTHER KNOWN OR SUSPECTED POOR FETAL GROWTH, SECOND TRIMESTER, NOT APPLICABLE OR UNSPECIFIED: ICD-10-CM

## 2023-04-25 DIAGNOSIS — O26.849 UTERINE SIZE-DATE DISCREPANCY, ANTEPARTUM: Primary | ICD-10-CM

## 2023-04-25 PROBLEM — Z37.9 NORMAL LABOR: Status: RESOLVED | Noted: 2022-04-17 | Resolved: 2023-04-25

## 2023-04-25 PROBLEM — O23.02 PYELONEPHRITIS AFFECTING PREGNANCY IN SECOND TRIMESTER: Status: RESOLVED | Noted: 2021-11-19 | Resolved: 2023-04-25

## 2023-04-25 PROBLEM — O23.42 UTI (URINARY TRACT INFECTION) DURING PREGNANCY, SECOND TRIMESTER: Status: RESOLVED | Noted: 2019-09-26 | Resolved: 2023-04-25

## 2023-04-25 PROCEDURE — 76811 OB US DETAILED SNGL FETUS: CPT

## 2023-04-25 PROCEDURE — 76811 OB US DETAILED SNGL FETUS: CPT | Performed by: OBSTETRICS & GYNECOLOGY

## 2023-04-25 NOTE — PROGRESS NOTES
No complaints today.  Pt reports she is here for some possible IUGR,  There was confusion over EDC.  Pt reports per her period it was 8/28/23,  Per a 12 week us the EDC was 8/3/23.  Pt certain of her periods.  Nexr f/u with Renée  5/22/23      NIPT - declined

## 2023-04-25 NOTE — PROGRESS NOTES
"Documentation of the ultrasound findings, images, and interpretations will be available in the patient's Viewpoint report which is located in the imaging tab in chart review.        Maternal/Fetal Medicine Consult Note     Name: Radha Villanueva    : 2000     MRN: [id number]     Referring Provider: Henry Chinchilla CNM    Chief Complaint  early concern for IUGR     Subjective     History of Present Illness:  Rahda Villanueva is a 23 y.o.  22w1d who presents today for S<D    BRIAN: Estimated Date of Delivery: 23     ROS:   As noted in HPI.     Past Medical History:   Diagnosis Date   • Bartholin gland cyst 2017    removed   • frequent Urinary tract infection     not taking meds at this time   • Pyelonephritis affecting pregnancy in second trimester 2021   • UTI (urinary tract infection) during pregnancy, second trimester 2019      Past Surgical History:   Procedure Laterality Date   • APPENDECTOMY     • CYST REMOVAL      bartholin   • TONSILLECTOMY     • WISDOM TOOTH EXTRACTION        OB History        4    Para   2    Term   2       0    AB   1    Living   2       SAB   0    IAB   0    Ectopic   0    Molar   0    Multiple   0    Live Births   2          Obstetric Comments   Fob #1 - Pregnancy #1-#4    Pregnancy #3 - rapid labor - precipitous delivery             Objective     Vital Signs  /72   Ht 168.9 cm (66.5\")   Wt 65 kg (143 lb 6.4 oz)   Estimated body mass index is 22.8 kg/m² as calculated from the following:    Height as of this encounter: 168.9 cm (66.5\").    Weight as of this encounter: 65 kg (143 lb 6.4 oz).    Physical Exam    Ultrasound Impression:   See viewpoint    Assessment and Plan     Radha Villanueva is a 23 y.o.  22w1d who presents today for S<D    Diagnoses and all orders for this visit:    1. Uterine size-date discrepancy, antepartum (Primary)  Assessment & Plan:  Patient referred for size less than dates.  Based on a 12-week " ultrasound her EDC was 6 days later than what was predicted by her last menstrual period.  6 days on a 12-week scan is the limits of ability to change the EDC however I would probably recommend changing EDC based on the 12-week scan making her EDC 3 September, 2023    Ultrasound today demonstrates a normal-appearing fetus, size most consistent with a 12-week ultrasound with an EDC of 3 September, 2023.  This is the EDC that I would use.  We will rescan the fetus again in 6 weeks to ensure normal growth.    Orders:  -     [facility]; Future    2. Pregnancy, unspecified gestational age  -     [facility]; Future       Follow Up  F/U 6 weeks    I spent 15 minutes caring for the patient on the day of service. This included: obtaining or reviewing a separately obtained medical history, reviewing patient records, performing a medically appropriate exam and/or evaluation, counseling or educating the patient/family/caregiver, ordering medications, labs, and/or procedures and documenting such in the medical record. This does not include time spent on review and interpretation of other tests such as fetal ultrasound or the performance of other procedures such as amniocentesis or CVS.      Douglas A. Milligan, MD  Maternal Fetal Medicine, [facility]     2023

## 2023-04-25 NOTE — ASSESSMENT & PLAN NOTE
Patient referred for size less than dates.  Based on a 12-week ultrasound her EDC was 6 days later than what was predicted by her last menstrual period.  6 days on a 12-week scan is the limits of ability to change the EDC however I would probably recommend changing EDC based on the 12-week scan making her EDC 3 September, 2023    Ultrasound today demonstrates a normal-appearing fetus, size most consistent with a 12-week ultrasound with an EDC of 3 September, 2023.  This is the EDC that I would use.  We will rescan the fetus again in 6 weeks to ensure normal growth.

## 2023-04-25 NOTE — LETTER
2023     Henry Chinchilla CNM  [address] [facility]  [address] [place] [address]    Patient: Radha Villanueva   YOB: 2000   Date of Visit: 2023       Dear Henry Chinchilla CNM,    Thank you for referring Radha Villanueva to me for evaluation. Below is a copy of my consult note.    If you have questions, please do not hesitate to call me. I look forward to following Radha along with you.         Sincerely,        Douglas A. Milligan, MD        CC: No Recipients    No complaints today.  Pt reports she is here for some possible IUGR,  There was confusion over EDC.  Pt reports per her period it was 23,  Per a 12 week us the EDC was 8/3/23.  Pt certain of her periods.  Nexr f/u with Renée  23      NIPT - declined     Documentation of the ultrasound findings, images, and interpretations will be available in the patient's Viewpoint report which is located in the imaging tab in chart review.        Maternal/Fetal Medicine Consult Note     Name: Radha Villanueva    : 2000     MRN: [id number]     Referring Provider: Henry Chinchilla CNM    Chief Complaint  early concern for IUGR     Subjective     History of Present Illness:  Radha Villanueva is a 23 y.o.  22w1d who presents today for S<D    BRIAN: Estimated Date of Delivery: 23     ROS:   As noted in HPI.     Past Medical History:   Diagnosis Date   • Bartholin gland cyst 2017    removed   • frequent Urinary tract infection     not taking meds at this time   • Pyelonephritis affecting pregnancy in second trimester 2021   • UTI (urinary tract infection) during pregnancy, second trimester 2019      Past Surgical History:   Procedure Laterality Date   • APPENDECTOMY     • CYST REMOVAL      bartholin   • TONSILLECTOMY     • WISDOM TOOTH EXTRACTION        OB History          4    Para   2    Term   2       0    AB   1    Living   2         SAB   0    IAB   0    Ectopic   0    Molar   0     "Multiple   0    Live Births   2          Obstetric Comments   Fob #1 - Pregnancy #1-#4    Pregnancy #3 - rapid labor - precipitous delivery               Objective     Vital Signs  /72   Ht 168.9 cm (66.5\")   Wt 65 kg (143 lb 6.4 oz)   Estimated body mass index is 22.8 kg/m² as calculated from the following:    Height as of this encounter: 168.9 cm (66.5\").    Weight as of this encounter: 65 kg (143 lb 6.4 oz).    Physical Exam    Ultrasound Impression:   See viewpoint    Assessment and Plan     Radha Villanueva is a 23 y.o.  22w1d who presents today for S<D    Diagnoses and all orders for this visit:    1. Uterine size-date discrepancy, antepartum (Primary)  Assessment & Plan:  Patient referred for size less than dates.  Based on a 12-week ultrasound her EDC was 6 days later than what was predicted by her last menstrual period.  6 days on a 12-week scan is the limits of ability to change the EDC however I would probably recommend changing EDC based on the 12-week scan making her EDC 3     Ultrasound today demonstrates a normal-appearing fetus, size most consistent with a 12-week ultrasound with an EDC of 3 September, 2023.  This is the EDC that I would use.  We will rescan the fetus again in 6 weeks to ensure normal growth.    Orders:  -     [facility]; Future    2. Pregnancy, unspecified gestational age  -     [facility]; Future       Follow Up  F/U 6 weeks    I spent 15 minutes caring for the patient on the day of service. This included: obtaining or reviewing a separately obtained medical history, reviewing patient records, performing a medically appropriate exam and/or evaluation, counseling or educating the patient/family/caregiver, ordering medications, labs, and/or procedures and documenting such in the medical record. This does not include time spent on review and interpretation of other tests such as fetal ultrasound or the " performance of other procedures such as amniocentesis or CVS.      Douglas A. Milligan, MD  Maternal Fetal Medicine, [facility]     2023

## 2023-07-31 LAB — EXTERNAL GROUP B STREP ANTIGEN: NORMAL

## 2023-08-26 ENCOUNTER — HOSPITAL ENCOUNTER (OUTPATIENT)
Facility: HOSPITAL | Age: 23
Setting detail: OBSERVATION
Discharge: HOME | End: 2023-08-26
Attending: OBSTETRICS & GYNECOLOGY | Admitting: OBSTETRICS & GYNECOLOGY
Payer: COMMERCIAL

## 2023-08-26 VITALS
HEART RATE: 93 BPM | TEMPERATURE: 98.3 F | BODY MASS INDEX: 25.9 KG/M2 | WEIGHT: 165 LBS | DIASTOLIC BLOOD PRESSURE: 81 MMHG | HEIGHT: 67 IN | SYSTOLIC BLOOD PRESSURE: 124 MMHG | RESPIRATION RATE: 16 BRPM

## 2023-08-26 PROBLEM — Z34.93 THIRD TRIMESTER PREGNANCY: Status: ACTIVE | Noted: 2023-08-26

## 2023-08-26 PROCEDURE — 84112 EVAL AMNIOTIC FLUID PROTEIN: CPT | Performed by: OBSTETRICS & GYNECOLOGY

## 2023-08-26 PROCEDURE — G0378 HOSPITAL OBSERVATION PER HR: HCPCS

## 2023-08-26 PROCEDURE — 59025 FETAL NON-STRESS TEST: CPT | Performed by: OBSTETRICS & GYNECOLOGY

## 2023-08-26 PROCEDURE — 59025 FETAL NON-STRESS TEST: CPT

## 2023-08-26 PROCEDURE — 99213 OFFICE O/P EST LOW 20 MIN: CPT | Performed by: OBSTETRICS & GYNECOLOGY

## 2023-08-26 PROCEDURE — G0463 HOSPITAL OUTPT CLINIC VISIT: HCPCS

## 2023-08-26 NOTE — H&P
[facility]  Obstetric History and Physical    Referring Provider: Cierra Gordon MD      Chief Complaint   Patient presents with    Rupture of Membranes       Subjective     Patient is a 23 y.o. female  currently at 38w6d, who presents with complaint of possible rupture of membranes.  Patient reports a gush of clear fluid at 1 AM this morning.  Patient denies any subsequent leaking of fluid, vaginal bleeding, regular activity, trauma, fever, urinary symptoms or any other concerns.  Patient reports normal fetal activity.   course uncomplicated to date.    .    The following portions of the patients history were reviewed and updated as appropriate: current medications, allergies, past medical history, past surgical history, past family history, past social history, and problem list .       Prenatal Information:   Maternal Prenatal Labs  Blood Type No results found for: ABO   Rh Status No results found for: RH   Antibody Screen No results found for: ABSCRN   Gonnorhea No results found for: GCCX   Chlamydia No results found for: CLAMYDCU   RPR No results found for: RPR   Syphilis Antibody No results found for: SYPHILIS   Rubella No results found for: RUBELLAIGGIN   Hepatitis B Surface Antigen No results found for: HEPBSAG   HIV-1 Antibody No results found for: LABHIV1   Hepatitis C Antibody No results found for: HEPCAB   Rapid Urin Drug Screen No results found for: AMPMETHU, BARBITSCNUR, LABBENZSCN, LABMETHSCN, LABOPIASCN, THCURSCR, COCAINEUR, AMPHETSCREEN, PROPOXSCN, BUPRENORSCNU, METAMPSCNUR, OXYCODONESCN, TRICYCLICSCN   Group B Strep Culture No results found for: GBSANTIGEN           External Prenatal Results       Pregnancy Outside Results - Transcribed From Office Records - See Scanned Records For Details       Test Value Date Time    ABO  O  23 1000    Rh  Positive  23 1000    Antibody Screen  Negative  23 1000    Varicella IgG       Rubella  1.57 index 23 1000    Hgb   12.9 g/dL 23 1252       11.9 g/dL 23 1000    Hct  37.7 % 23 1252       36.7 % 23 1000    Glucose Fasting GTT       Glucose Tolerance Test 1 hour ^ 148  19     Glucose Tolerance Test 3 hour       Gonorrhea (discrete)       Chlamydia (discrete)       RPR  Non-Reactive  21 1223    VDRL       Syphilis Antibody       HBsAg  Non-Reactive  23 1000    Herpes Simplex Virus PCR       Herpes Simplex VIrus Culture       HIV  Non-Reactive  23 1000    Hep C RNA Quant PCR       Hep C Antibody  Non-Reactive  23 1000    AFP       Group B Strep ^ Negative  19     GBS Susceptibility to Clindamycin       GBS Susceptibility to Erythromycin       Fetal Fibronectin       Genetic Testing, Maternal Blood                 Drug Screening       Test Value Date Time    Urine Drug Screen ^ Negative  19     Amphetamine Screen  Negative  23 1054       Negative  23 1000    Barbiturate Screen  Negative  23 1054       Negative  23 1000    Benzodiazepine Screen  Negative  23 1054       Negative  23 1000    Methadone Screen  Negative  23 1054       Negative  23 1000    Phencyclidine Screen  Negative  23 1054       Negative  23 1000    Opiates Screen  Negative  23 1054       Positive  23 1000    THC Screen  Negative  23 1054       Negative  23 1000    Cocaine Screen       Propoxyphene Screen  Negative  23 1054       Negative  23 1000    Buprenorphine Screen  Negative  23 1054       Negative  23 1000    Methamphetamine Screen       Oxycodone Screen  Negative  23 1054       Negative  23 1000    Tricyclic Antidepressants Screen  Negative  23 1054       Negative  23 1000              Legend    ^: Historical                              Past OB History:       OB History    Para Term  AB Living   4 2 2 0 1 2   SAB IAB Ectopic Molar Multiple Live Births    0 0 0 0 0 2      # Outcome Date GA Lbr Wilian/2nd Weight Sex Delivery Anes PTL Lv   4 Current            3 Term 04/17/22 39w3d  3185 g (7 lb 0.4 oz) M Vag-Spont None N TYLER      Name: RYNE ESPINO   2 Term 11/21/19 38w3d 13:21 / 00:15 2860 g (6 lb 4.9 oz) F Vag-Spont EPI N TYLER      Name: CHUCKY ESPINO      Apgar1: 9  Apgar5: 9   1 AB 11/24/18 7w0d    SAB         Obstetric Comments   Fob #1 - Pregnancy #1-#4      Pregnancy #3 - rapid labor - precipitous delivery       Past Medical History: Past Medical History:   Diagnosis Date    Bartholin gland cyst 2017    removed    frequent Urinary tract infection     not taking meds at this time    Pyelonephritis affecting pregnancy in second trimester 11/19/2021    UTI (urinary tract infection) during pregnancy, second trimester 9/26/2019      Past Surgical History Past Surgical History:   Procedure Laterality Date    APPENDECTOMY  2021    CYST REMOVAL      bartholin    TONSILLECTOMY  2006    WISDOM TOOTH EXTRACTION        Family History: Family History   Problem Relation Age of Onset    No Known Problems Mother     No Known Problems Father     Cancer Maternal Grandmother         Unsure what kind    No Known Problems Maternal Grandfather     No Known Problems Paternal Grandmother     No Known Problems Paternal Grandfather       Social History:  reports that she has never smoked. She has never used smokeless tobacco.   reports no history of alcohol use.   reports no history of drug use.                   General ROS Negative Findings:Headaches, Visual Changes, Epigastric pain, Anorexia, Nausia/Vomiting, and Vaginal Bleeding    ROS     All other systems have been reviewed and are neg  Objective       Vital Signs Range for the last 24 hours  Temperature: Temp:  [98.3 °F (36.8 °C)] 98.3 °F (36.8 °C)   Temp Source: Temp src: Oral   BP: BP: (124)/(81) 124/81   Pulse: Heart Rate:  [93] 93   Respirations: Resp:  [16] 16   SPO2:     O2 Amount (l/min):     O2 Devices      Weight: Weight:  [74.8 kg (165 lb)] 74.8 kg (165 lb)     Physical Examination:   General:   alert, appears stated age, and cooperative   Skin:   normal   HEENT:     Lungs:      Heart:      Gastrointestinal: Abdomen soft, gravid uterus, guarding benign exam   Lower Extremities: No edema, no calf tenderness   :    Musculoskeletal:     Neuro: No focal deficits noted         Presentation:    Cervix: Exam by: Method: sterile exam per RN   Dilation:  1 to 2 cm   Effacement: Cervical Effacement: 70%   Station:  -2  pH less than 4,  negative AmniSure       Fetal Heart Rate Assessment   Method: Fetal HR Assessment Method: external   Beats/min: Fetal HR (beats/min): 140   Baseline: Fetal HR Baseline: normal range   Varibility: Fetal HR Variability: moderate (amplitude range 6 to 25 bpm)   Accels: Fetal HR Accelerations: absent   Decels: Fetal HR Decelerations: absent   Tracing Category:     NST-indications possible rupture membranes, interpretation reactive, moderate variability, accelerations present 15 x 15, no fetal deceleration noted, onset 1408, end time 1456, no regular contractions noted.  Uterine Assessment   Method: Method: external tocotransducer   Frequency (min):     Ctx Count in 10 min:     Duration:     Intensity: Contraction Intensity: no contractions   Intensity by IUPC:     Resting Tone: Uterine Resting Tone: soft by palpation   Resting Tone by IUPC:     [place] Units:       Laboratory Results:   Lab Results (last 24 hours)       ** No results found for the last 24 hours. **          Radiology Review:   Imaging Results (Last 24 Hours)       ** No results found for the last 24 hours. **          Other Studies: Its ultrasound feels single IUP vertex presentation, fetus active, maximum vertical pocket 4.5 cm.    Assessment & Plan       Third trimester pregnancy        Assessment:  1.  Intrauterine pregnancy at 38w6d weeks gestation with reactive fetal status.    2.  False labor or evidence of ruptured  membranes  3.   4.      Plan:  1.  Discharged home, kick count, labor instructions given, follow-up OB provider in 2 days for schedule appointment or as needed  2. Plan of care has been reviewed with patient.  3.  Risks, benefits of treatment plan have been discussed.  4.  All questions have been answered.  5      Corky Elliott,   8/26/2023  15:05 EDT

## 2023-08-31 LAB
POC AMNISURE: NEGATIVE
POC AMNISURE: NEGATIVE

## 2023-08-31 PROCEDURE — 84112 EVAL AMNIOTIC FLUID PROTEIN: CPT | Performed by: OBSTETRICS & GYNECOLOGY

## 2023-09-07 ENCOUNTER — HOSPITAL ENCOUNTER (INPATIENT)
Facility: HOSPITAL | Age: 23
LOS: 2 days | Discharge: HOME | End: 2023-09-09
Attending: OBSTETRICS & GYNECOLOGY | Admitting: OBSTETRICS & GYNECOLOGY
Payer: COMMERCIAL

## 2023-09-07 ENCOUNTER — ANESTHESIA (OUTPATIENT)
Dept: LABOR AND DELIVERY | Facility: HOSPITAL | Age: 23
End: 2023-09-07
Payer: COMMERCIAL

## 2023-09-07 ENCOUNTER — ANESTHESIA EVENT (OUTPATIENT)
Dept: LABOR AND DELIVERY | Facility: HOSPITAL | Age: 23
End: 2023-09-07
Payer: COMMERCIAL

## 2023-09-07 PROBLEM — Z37.9 NORMAL LABOR: Status: ACTIVE | Noted: 2023-09-07

## 2023-09-07 LAB
ABO GROUP BLD: NORMAL
AMPHET+METHAMPHET UR QL: NEGATIVE
AMPHETAMINES UR QL: NEGATIVE
BARBITURATES UR QL SCN: NEGATIVE
BENZODIAZ UR QL SCN: NEGATIVE
BLD GP AB SCN SERPL QL: NEGATIVE
BUPRENORPHINE SERPL-MCNC: NEGATIVE NG/ML
CANNABINOIDS SERPL QL: NEGATIVE
COCAINE UR QL: NEGATIVE
DEPRECATED RDW RBC AUTO: 43.3 FL (ref 37–54)
ERYTHROCYTE [DISTWIDTH] IN BLOOD BY AUTOMATED COUNT: 12.4 % (ref 12.3–15.4)
FENTANYL UR-MCNC: NEGATIVE NG/ML
HCT VFR BLD AUTO: 39.2 % (ref 34–46.6)
HGB BLD-MCNC: 13.5 G/DL (ref 12–15.9)
MCH RBC QN AUTO: 32.5 PG (ref 26.6–33)
MCHC RBC AUTO-ENTMCNC: 34.4 G/DL (ref 31.5–35.7)
MCV RBC AUTO: 94.2 FL (ref 79–97)
METHADONE UR QL SCN: NEGATIVE
OPIATES UR QL: NEGATIVE
OXYCODONE UR QL SCN: NEGATIVE
PCP UR QL SCN: NEGATIVE
PLATELET # BLD AUTO: 140 10*3/MM3 (ref 140–450)
PMV BLD AUTO: 12.5 FL (ref 6–12)
PROPOXYPH UR QL: NEGATIVE
RBC # BLD AUTO: 4.16 10*6/MM3 (ref 3.77–5.28)
RH BLD: POSITIVE
T&S EXPIRATION DATE: NORMAL
TRICYCLICS UR QL SCN: NEGATIVE
WBC NRBC COR # BLD: 17.09 10*3/MM3 (ref 3.4–10.8)

## 2023-09-07 PROCEDURE — 80307 DRUG TEST PRSMV CHEM ANLYZR: CPT | Performed by: ADVANCED PRACTICE MIDWIFE

## 2023-09-07 PROCEDURE — 25010000002 BUPIVACAINE (PF) 0.5 % SOLUTION: Performed by: ANESTHESIOLOGY

## 2023-09-07 PROCEDURE — 86900 BLOOD TYPING SEROLOGIC ABO: CPT | Performed by: ADVANCED PRACTICE MIDWIFE

## 2023-09-07 PROCEDURE — C1755 CATHETER, INTRASPINAL: HCPCS

## 2023-09-07 PROCEDURE — C1755 CATHETER, INTRASPINAL: HCPCS | Performed by: ANESTHESIOLOGY

## 2023-09-07 PROCEDURE — 25010000002 FENTANYL CITRATE (PF) 50 MCG/ML SOLUTION: Performed by: ANESTHESIOLOGY

## 2023-09-07 PROCEDURE — 59025 FETAL NON-STRESS TEST: CPT

## 2023-09-07 PROCEDURE — 86850 RBC ANTIBODY SCREEN: CPT | Performed by: ADVANCED PRACTICE MIDWIFE

## 2023-09-07 PROCEDURE — 25010000002 ROPIVACAINE PER 1 MG: Performed by: ANESTHESIOLOGY

## 2023-09-07 PROCEDURE — 10907ZC DRAINAGE OF AMNIOTIC FLUID, THERAPEUTIC FROM PRODUCTS OF CONCEPTION, VIA NATURAL OR ARTIFICIAL OPENING: ICD-10-PCS | Performed by: OBSTETRICS & GYNECOLOGY

## 2023-09-07 PROCEDURE — 85027 COMPLETE CBC AUTOMATED: CPT | Performed by: ADVANCED PRACTICE MIDWIFE

## 2023-09-07 PROCEDURE — 51702 INSERT TEMP BLADDER CATH: CPT

## 2023-09-07 PROCEDURE — 86901 BLOOD TYPING SEROLOGIC RH(D): CPT | Performed by: ADVANCED PRACTICE MIDWIFE

## 2023-09-07 RX ORDER — SODIUM CHLORIDE 0.9 % (FLUSH) 0.9 %
10 SYRINGE (ML) INJECTION EVERY 12 HOURS SCHEDULED
Status: DISCONTINUED | OUTPATIENT
Start: 2023-09-07 | End: 2023-09-07 | Stop reason: HOSPADM

## 2023-09-07 RX ORDER — PROMETHAZINE HYDROCHLORIDE 12.5 MG/1
12.5 SUPPOSITORY RECTAL EVERY 6 HOURS PRN
Status: DISCONTINUED | OUTPATIENT
Start: 2023-09-07 | End: 2023-09-07 | Stop reason: HOSPADM

## 2023-09-07 RX ORDER — HYDROCORTISONE 25 MG/G
1 CREAM TOPICAL AS NEEDED
Status: DISCONTINUED | OUTPATIENT
Start: 2023-09-07 | End: 2023-09-09 | Stop reason: HOSPADM

## 2023-09-07 RX ORDER — BISACODYL 10 MG
10 SUPPOSITORY, RECTAL RECTAL DAILY PRN
Status: DISCONTINUED | OUTPATIENT
Start: 2023-09-08 | End: 2023-09-09 | Stop reason: HOSPADM

## 2023-09-07 RX ORDER — METHYLERGONOVINE MALEATE 0.2 MG/ML
200 INJECTION INTRAVENOUS ONCE AS NEEDED
Status: DISCONTINUED | OUTPATIENT
Start: 2023-09-07 | End: 2023-09-07 | Stop reason: HOSPADM

## 2023-09-07 RX ORDER — LIDOCAINE HYDROCHLORIDE 10 MG/ML
0.5 INJECTION, SOLUTION EPIDURAL; INFILTRATION; INTRACAUDAL; PERINEURAL ONCE AS NEEDED
Status: DISCONTINUED | OUTPATIENT
Start: 2023-09-07 | End: 2023-09-07 | Stop reason: HOSPADM

## 2023-09-07 RX ORDER — SODIUM CHLORIDE, SODIUM LACTATE, POTASSIUM CHLORIDE, CALCIUM CHLORIDE 600; 310; 30; 20 MG/100ML; MG/100ML; MG/100ML; MG/100ML
125 INJECTION, SOLUTION INTRAVENOUS CONTINUOUS
Status: DISCONTINUED | OUTPATIENT
Start: 2023-09-07 | End: 2023-09-09 | Stop reason: HOSPADM

## 2023-09-07 RX ORDER — ONDANSETRON 4 MG/1
4 TABLET, FILM COATED ORAL EVERY 6 HOURS PRN
Status: DISCONTINUED | OUTPATIENT
Start: 2023-09-07 | End: 2023-09-07 | Stop reason: HOSPADM

## 2023-09-07 RX ORDER — SODIUM CHLORIDE 0.9 % (FLUSH) 0.9 %
1-10 SYRINGE (ML) INJECTION AS NEEDED
Status: DISCONTINUED | OUTPATIENT
Start: 2023-09-07 | End: 2023-09-09 | Stop reason: HOSPADM

## 2023-09-07 RX ORDER — SODIUM CHLORIDE 9 MG/ML
40 INJECTION, SOLUTION INTRAVENOUS AS NEEDED
Status: DISCONTINUED | OUTPATIENT
Start: 2023-09-07 | End: 2023-09-07 | Stop reason: HOSPADM

## 2023-09-07 RX ORDER — OXYTOCIN/0.9 % SODIUM CHLORIDE 30/500 ML
PLASTIC BAG, INJECTION (ML) INTRAVENOUS
Status: DISCONTINUED
Start: 2023-09-07 | End: 2023-09-09 | Stop reason: HOSPADM

## 2023-09-07 RX ORDER — EPHEDRINE SULFATE 5 MG/ML
10 INJECTION INTRAVENOUS
Status: DISCONTINUED | OUTPATIENT
Start: 2023-09-07 | End: 2023-09-07 | Stop reason: HOSPADM

## 2023-09-07 RX ORDER — SODIUM CHLORIDE 0.9 % (FLUSH) 0.9 %
10 SYRINGE (ML) INJECTION AS NEEDED
Status: DISCONTINUED | OUTPATIENT
Start: 2023-09-07 | End: 2023-09-07 | Stop reason: HOSPADM

## 2023-09-07 RX ORDER — ONDANSETRON 2 MG/ML
4 INJECTION INTRAMUSCULAR; INTRAVENOUS EVERY 6 HOURS PRN
Status: DISCONTINUED | OUTPATIENT
Start: 2023-09-07 | End: 2023-09-09 | Stop reason: HOSPADM

## 2023-09-07 RX ORDER — FENTANYL CITRATE 50 UG/ML
INJECTION, SOLUTION INTRAMUSCULAR; INTRAVENOUS AS NEEDED
Status: DISCONTINUED | OUTPATIENT
Start: 2023-09-07 | End: 2023-09-07 | Stop reason: SURG

## 2023-09-07 RX ORDER — DOCUSATE SODIUM 100 MG/1
100 CAPSULE, LIQUID FILLED ORAL 2 TIMES DAILY
Status: DISCONTINUED | OUTPATIENT
Start: 2023-09-07 | End: 2023-09-09 | Stop reason: HOSPADM

## 2023-09-07 RX ORDER — OXYTOCIN/0.9 % SODIUM CHLORIDE 30/500 ML
999 PLASTIC BAG, INJECTION (ML) INTRAVENOUS ONCE
Status: COMPLETED | OUTPATIENT
Start: 2023-09-07 | End: 2023-09-07

## 2023-09-07 RX ORDER — ACETAMINOPHEN 325 MG/1
650 TABLET ORAL EVERY 4 HOURS PRN
Status: DISCONTINUED | OUTPATIENT
Start: 2023-09-07 | End: 2023-09-07 | Stop reason: HOSPADM

## 2023-09-07 RX ORDER — OXYTOCIN/0.9 % SODIUM CHLORIDE 30/500 ML
250 PLASTIC BAG, INJECTION (ML) INTRAVENOUS CONTINUOUS
Status: DISPENSED | OUTPATIENT
Start: 2023-09-07 | End: 2023-09-07

## 2023-09-07 RX ORDER — BUPIVACAINE HYDROCHLORIDE 5 MG/ML
INJECTION, SOLUTION EPIDURAL; INTRACAUDAL AS NEEDED
Status: DISCONTINUED | OUTPATIENT
Start: 2023-09-07 | End: 2023-09-07 | Stop reason: SURG

## 2023-09-07 RX ORDER — CARBOPROST TROMETHAMINE 250 UG/ML
250 INJECTION, SOLUTION INTRAMUSCULAR AS NEEDED
Status: DISCONTINUED | OUTPATIENT
Start: 2023-09-07 | End: 2023-09-07 | Stop reason: HOSPADM

## 2023-09-07 RX ORDER — ROPIVACAINE HYDROCHLORIDE 2 MG/ML
15 INJECTION, SOLUTION EPIDURAL; INFILTRATION; PERINEURAL CONTINUOUS
Status: DISCONTINUED | OUTPATIENT
Start: 2023-09-07 | End: 2023-09-09 | Stop reason: HOSPADM

## 2023-09-07 RX ORDER — TERBUTALINE SULFATE 1 MG/ML
0.25 INJECTION, SOLUTION SUBCUTANEOUS AS NEEDED
Status: DISCONTINUED | OUTPATIENT
Start: 2023-09-07 | End: 2023-09-07 | Stop reason: HOSPADM

## 2023-09-07 RX ORDER — ONDANSETRON 2 MG/ML
4 INJECTION INTRAMUSCULAR; INTRAVENOUS EVERY 6 HOURS PRN
Status: DISCONTINUED | OUTPATIENT
Start: 2023-09-07 | End: 2023-09-07 | Stop reason: HOSPADM

## 2023-09-07 RX ORDER — NALBUPHINE HYDROCHLORIDE 10 MG/ML
5 INJECTION, SOLUTION INTRAMUSCULAR; INTRAVENOUS; SUBCUTANEOUS
Status: DISCONTINUED | OUTPATIENT
Start: 2023-09-07 | End: 2023-09-07 | Stop reason: HOSPADM

## 2023-09-07 RX ORDER — DIPHENHYDRAMINE HYDROCHLORIDE 50 MG/ML
12.5 INJECTION INTRAMUSCULAR; INTRAVENOUS EVERY 8 HOURS PRN
Status: DISCONTINUED | OUTPATIENT
Start: 2023-09-07 | End: 2023-09-07 | Stop reason: HOSPADM

## 2023-09-07 RX ORDER — CITRIC ACID/SODIUM CITRATE 334-500MG
30 SOLUTION, ORAL ORAL ONCE
Status: DISCONTINUED | OUTPATIENT
Start: 2023-09-07 | End: 2023-09-07 | Stop reason: HOSPADM

## 2023-09-07 RX ORDER — IBUPROFEN 600 MG/1
600 TABLET ORAL EVERY 6 HOURS PRN
Status: DISCONTINUED | OUTPATIENT
Start: 2023-09-07 | End: 2023-09-07 | Stop reason: HOSPADM

## 2023-09-07 RX ORDER — PROMETHAZINE HYDROCHLORIDE 12.5 MG/1
12.5 TABLET ORAL EVERY 6 HOURS PRN
Status: DISCONTINUED | OUTPATIENT
Start: 2023-09-07 | End: 2023-09-07 | Stop reason: HOSPADM

## 2023-09-07 RX ORDER — HYDROCODONE BITARTRATE AND ACETAMINOPHEN 5; 325 MG/1; MG/1
1 TABLET ORAL EVERY 4 HOURS PRN
Status: DISCONTINUED | OUTPATIENT
Start: 2023-09-07 | End: 2023-09-09 | Stop reason: HOSPADM

## 2023-09-07 RX ORDER — METOCLOPRAMIDE HYDROCHLORIDE 5 MG/ML
10 INJECTION INTRAMUSCULAR; INTRAVENOUS ONCE AS NEEDED
Status: DISCONTINUED | OUTPATIENT
Start: 2023-09-07 | End: 2023-09-07 | Stop reason: HOSPADM

## 2023-09-07 RX ORDER — LIDOCAINE HYDROCHLORIDE AND EPINEPHRINE 15; 5 MG/ML; UG/ML
INJECTION, SOLUTION EPIDURAL AS NEEDED
Status: DISCONTINUED | OUTPATIENT
Start: 2023-09-07 | End: 2023-09-07 | Stop reason: SURG

## 2023-09-07 RX ORDER — ACETAMINOPHEN 325 MG/1
650 TABLET ORAL EVERY 6 HOURS PRN
Status: DISCONTINUED | OUTPATIENT
Start: 2023-09-07 | End: 2023-09-09 | Stop reason: HOSPADM

## 2023-09-07 RX ORDER — IBUPROFEN 600 MG/1
600 TABLET ORAL EVERY 6 HOURS PRN
Status: DISCONTINUED | OUTPATIENT
Start: 2023-09-07 | End: 2023-09-09 | Stop reason: HOSPADM

## 2023-09-07 RX ORDER — MAGNESIUM CARB/ALUMINUM HYDROX 105-160MG
30 TABLET,CHEWABLE ORAL ONCE
Status: DISCONTINUED | OUTPATIENT
Start: 2023-09-07 | End: 2023-09-07 | Stop reason: HOSPADM

## 2023-09-07 RX ORDER — HYDROCODONE BITARTRATE AND ACETAMINOPHEN 10; 325 MG/1; MG/1
1 TABLET ORAL EVERY 4 HOURS PRN
Status: DISCONTINUED | OUTPATIENT
Start: 2023-09-07 | End: 2023-09-09 | Stop reason: HOSPADM

## 2023-09-07 RX ORDER — MISOPROSTOL 200 UG/1
800 TABLET ORAL AS NEEDED
Status: DISCONTINUED | OUTPATIENT
Start: 2023-09-07 | End: 2023-09-07 | Stop reason: HOSPADM

## 2023-09-07 RX ORDER — FAMOTIDINE 10 MG/ML
20 INJECTION, SOLUTION INTRAVENOUS ONCE AS NEEDED
Status: DISCONTINUED | OUTPATIENT
Start: 2023-09-07 | End: 2023-09-07 | Stop reason: HOSPADM

## 2023-09-07 RX ORDER — ONDANSETRON 2 MG/ML
4 INJECTION INTRAMUSCULAR; INTRAVENOUS ONCE AS NEEDED
Status: DISCONTINUED | OUTPATIENT
Start: 2023-09-07 | End: 2023-09-07 | Stop reason: HOSPADM

## 2023-09-07 RX ADMIN — Medication 250 ML/HR: at 07:12

## 2023-09-07 RX ADMIN — Medication 250 ML/HR: at 06:23

## 2023-09-07 RX ADMIN — ACETAMINOPHEN 650 MG: 325 TABLET ORAL at 14:24

## 2023-09-07 RX ADMIN — IBUPROFEN 600 MG: 600 TABLET ORAL at 12:42

## 2023-09-07 RX ADMIN — WITCH HAZEL 1 PAD: 500 SOLUTION RECTAL; TOPICAL at 10:07

## 2023-09-07 RX ADMIN — BUPIVACAINE HYDROCHLORIDE 6 ML: 5 INJECTION, SOLUTION EPIDURAL; INTRACAUDAL at 04:01

## 2023-09-07 RX ADMIN — Medication 1 APPLICATION: at 10:07

## 2023-09-07 RX ADMIN — DOCUSATE SODIUM 100 MG: 100 CAPSULE, LIQUID FILLED ORAL at 10:07

## 2023-09-07 RX ADMIN — ROPIVACAINE HYDROCHLORIDE 15 ML/HR: 2 INJECTION, SOLUTION EPIDURAL; INFILTRATION; PERINEURAL at 04:01

## 2023-09-07 RX ADMIN — SODIUM CHLORIDE, POTASSIUM CHLORIDE, SODIUM LACTATE AND CALCIUM CHLORIDE 1000 ML: 600; 310; 30; 20 INJECTION, SOLUTION INTRAVENOUS at 03:00

## 2023-09-07 RX ADMIN — ACETAMINOPHEN 650 MG: 325 TABLET ORAL at 21:54

## 2023-09-07 RX ADMIN — IBUPROFEN 600 MG: 600 TABLET ORAL at 06:46

## 2023-09-07 RX ADMIN — EPHEDRINE SULFATE 10 MG: 5 INJECTION INTRAVENOUS at 05:03

## 2023-09-07 RX ADMIN — LIDOCAINE HYDROCHLORIDE AND EPINEPHRINE 3 ML: 15; 5 INJECTION, SOLUTION EPIDURAL at 04:01

## 2023-09-07 RX ADMIN — FENTANYL CITRATE 100 MCG: 50 INJECTION, SOLUTION INTRAMUSCULAR; INTRAVENOUS at 04:01

## 2023-09-07 RX ADMIN — Medication 999 ML/HR: at 06:00

## 2023-09-07 RX ADMIN — Medication: at 10:07

## 2023-09-07 RX ADMIN — IBUPROFEN 600 MG: 600 TABLET ORAL at 18:46

## 2023-09-07 NOTE — PROGRESS NOTES
9/7/2023  PPD #0    Subjective   Radha feels well.  Patient describes her lochia less than menses.  Pain is well controlled.  She is breastfeeding.      Objective   Temp: Temp:  [98.1 °F (36.7 °C)-98.9 °F (37.2 °C)] 98.5 °F (36.9 °C) Temp src: Oral   BP: BP: ()/(55-82) 120/60        Pulse: Heart Rate:  [] 77  RR: Resp:  [16-18] 16    General:  No acute distress   Abdomen: Fundus firm and beneath umbilicus   Pelvis: deferred     Lab Results   Component Value Date    WBC 17.09 (H) 09/07/2023    HGB 13.5 09/07/2023    HCT 39.2 09/07/2023    MCV 94.2 09/07/2023     09/07/2023    URICACID 4.6 06/23/2023    AST 23 06/23/2023    ALT 44 (H) 06/23/2023     Results from last 7 days   Lab Units 09/07/23  0259   ABO TYPING  O   RH TYPING  Positive   ANTIBODY SCREEN  Negative       Assessment  PPD# 0 after vaginal delivery    Plan  Routine postpartum care.      This note has been electronically signed.    Henry Chinchilla CNM  09:11 EDT  September 7, 2023

## 2023-09-07 NOTE — ANESTHESIA PREPROCEDURE EVALUATION
Anesthesia Evaluation     Patient summary reviewed and Nursing notes reviewed                Airway   Mallampati: I  TM distance: >3 FB  Neck ROM: full  No difficulty expected  Dental - normal exam     Pulmonary - negative pulmonary ROS and normal exam   Cardiovascular - negative cardio ROS and normal exam        Neuro/Psych- negative ROS  GI/Hepatic/Renal/Endo - negative ROS     Musculoskeletal (-) negative ROS    Abdominal  - normal exam    Bowel sounds: normal.   Substance History - negative use     OB/GYN    (+) Pregnant        Other                      Anesthesia Plan    ASA 2 - emergent     epidural       Anesthetic plan, risks, benefits, and alternatives have been provided, discussed and informed consent has been obtained with: patient.    Use of blood products discussed with patient .    Plan discussed with attending.    CODE STATUS:    Code Status (Patient has no pulse and is not breathing): CPR (Attempt to Resuscitate)  Medical Interventions (Patient has pulse or is breathing): Full

## 2023-09-07 NOTE — ANESTHESIA POSTPROCEDURE EVALUATION
Patient: Radha Villanueva    Procedure Summary       Date: 09/07/23 Room / Location:     Anesthesia Start: 0351 Anesthesia Stop: 0559    Procedure: LABOR ANALGESIA Diagnosis:     Scheduled Providers:  Provider: Thang Vera MD    Anesthesia Type: epidural ASA Status: 2 - Emergent            Anesthesia Type: epidural    Vitals  Vitals Value Taken Time   /56 09/07/23 1000   Temp 98.7 °F (37.1 °C) 09/07/23 1000   Pulse 86 09/07/23 1000   Resp 16 09/07/23 1000   SpO2             Post Anesthesia Care and Evaluation    Patient location during evaluation: bedside  Patient participation: complete - patient participated  Level of consciousness: awake and alert  Pain management: adequate    Airway patency: patent  Anesthetic complications: No anesthetic complications    Cardiovascular status: acceptable  Respiratory status: acceptable  Hydration status: acceptable  Post Neuraxial Block status: Motor and sensory function returned to baseline and No signs or symptoms of PDPH

## 2023-09-07 NOTE — H&P
[facility]  Obstetric History and Physical    No chief complaint on file.      Subjective     Patient is a 23 y.o. female  currently at 40w4d, who presents with reports of contractions that started around 2330. No lof. Did have some spotting after she had her membranes swept in the office but no active bleeding. Reports good FM.Reactive FHR tracing. .    Her prenatal care is benign.  Her previous obstetric/gynecological history is noted for 2 previous NSVDs. Her last delivery was a precipitous delivery in the ED.     The following portions of the patients history were reviewed and updated as appropriate: current medications, allergies, past medical history, and past surgical history .       Prenatal Information:   Maternal Prenatal Labs  Blood Type No results found for: ABO   Rh Status No results found for: RH   Antibody Screen No results found for: ABSCRN   Gonnorhea No results found for: GCCX   Chlamydia No results found for: CLAMYDCU   RPR No results found for: RPR   Syphilis Antibody No results found for: SYPHILIS   Rubella No results found for: RUBELLAIGGIN   Hepatitis B Surface Antigen No results found for: HEPBSAG   HIV-1 Antibody No results found for: LABHIV1   Hepatitis C Antibody No results found for: HEPCAB   Rapid Urin Drug Screen No results found for: AMPMETHU, BARBITSCNUR, LABBENZSCN, LABMETHSCN, LABOPIASCN, THCURSCR, COCAINEUR, AMPHETSCREEN, PROPOXSCN, BUPRENORSCNU, METAMPSCNUR, OXYCODONESCN, TRICYCLICSCN   Group B Strep Culture No results found for: GBSANTIGEN           External Prenatal Results       Pregnancy Outside Results - Transcribed From Office Records - See Scanned Records For Details       Test Value Date Time    ABO  O  23 1000    Rh  Positive  23 1000    Antibody Screen  Negative  23 1000    Varicella IgG       Rubella  1.57 index 23 1000    Hgb  12.9 g/dL 23 1252       11.9 g/dL 23 1000    Hct  37.7 % 23 1252       36.7 % 23 1000     Glucose Fasting GTT       Glucose Tolerance Test 1 hour ^ 148  19     Glucose Tolerance Test 3 hour       Gonorrhea (discrete)       Chlamydia (discrete)       RPR  Non-Reactive  21 1223    VDRL       Syphilis Antibody       HBsAg  Non-Reactive  23 1000    Herpes Simplex Virus PCR       Herpes Simplex VIrus Culture       HIV  Non-Reactive  23 1000    Hep C RNA Quant PCR       Hep C Antibody  Non-Reactive  23 1000    AFP       Group B Strep ^ Negative  19     GBS Susceptibility to Clindamycin       GBS Susceptibility to Erythromycin       Fetal Fibronectin       Genetic Testing, Maternal Blood                 Drug Screening       Test Value Date Time    Urine Drug Screen ^ Negative  19     Amphetamine Screen  Negative  23 1054       Negative  23 1000    Barbiturate Screen  Negative  23 1054       Negative  23 1000    Benzodiazepine Screen  Negative  23 1054       Negative  23 1000    Methadone Screen  Negative  23 1054       Negative  23 1000    Phencyclidine Screen  Negative  23 1054       Negative  23 1000    Opiates Screen  Negative  23 1054       Positive  23 1000    THC Screen  Negative  23 1054       Negative  23 1000    Cocaine Screen       Propoxyphene Screen  Negative  23 1054       Negative  23 1000    Buprenorphine Screen  Negative  23 1054       Negative  23 1000    Methamphetamine Screen       Oxycodone Screen  Negative  23 1054       Negative  23 1000    Tricyclic Antidepressants Screen  Negative  23 1054       Negative  23 1000              Legend    ^: Historical                              Past OB History:       OB History    Para Term  AB Living   4 2 2 0 1 2   SAB IAB Ectopic Molar Multiple Live Births   0 0 0 0 0 2      # Outcome Date GA Lbr Wilian/2nd Weight Sex Delivery Anes PTL Lv   4 Current            3  Term 04/17/22 39w3d  3185 g (7 lb 0.4 oz) M Vag-Spont None N TYLER      Name: RYNE ESPINO   2 Term 11/21/19 38w3d 13:21 / 00:15 2860 g (6 lb 4.9 oz) F Vag-Spont EPI N TYLER      Name: CHUCKY ESPINO      Apgar1: 9  Apgar5: 9   1 AB 11/24/18 7w0d    SAB         Obstetric Comments   Fob #1 - Pregnancy #1-#4      Pregnancy #3 - rapid labor - precipitous delivery       Past Medical History: Past Medical History:   Diagnosis Date    Bartholin gland cyst 2017    removed    frequent Urinary tract infection     not taking meds at this time    Pyelonephritis affecting pregnancy in second trimester 11/19/2021    UTI (urinary tract infection) during pregnancy, second trimester 9/26/2019      Past Surgical History Past Surgical History:   Procedure Laterality Date    APPENDECTOMY  2021    CYST REMOVAL      bartholin    TONSILLECTOMY  2006    WISDOM TOOTH EXTRACTION        Family History: Family History   Problem Relation Age of Onset    No Known Problems Mother     No Known Problems Father     Cancer Maternal Grandmother         Unsure what kind    No Known Problems Maternal Grandfather     No Known Problems Paternal Grandmother     No Known Problems Paternal Grandfather       Social History:  reports that she has never smoked. She has never used smokeless tobacco.   reports no history of alcohol use.   reports no history of drug use.        General ROS: Pertinent items are noted in HPI    Objective     There were no vitals filed for this visit.  Weight:       Physical Examination:   General Appearance: alert, well appearing, intermittent distress with contractions  Lungs: clear to auscultation, no wheezes, rales or rhonchi, symmetric air entry  Heart: regular rate and rhythm  Abdomen: FHT present    Extremities: no redness or tenderness in the calves or thighs  Skin: normal coloration and turgor, no rashes      Presentation: cephalic   Cervix: Exam by: Method: sterile exam per RN   Dilation: Cervical Dilation (cm): 7    Effacement: Cervical Effacement: 70%   Station: Fetal Station: -1        Fetal Heart Rate Assessment   Method:     Beats/min:     Baseline:     Varibility:     Accels:     Decels:     Tracing Category:       Uterine Assessment   Method:     Frequency (min):     Ctx Count in 10 min:     Duration:     Intensity:     Intensity by IUPC:     Resting Tone:     Resting Tone by IUPC:     Wells Units:       Laboratory Results: Pending  Radiology Review:   Other Studies:         Normal labor        Assessment:  1.  Intrauterine pregnancy at 40w4d weeks gestation with reactive fetal status.    2.  Active labor  without ROM  3. 7/70/-1/ Intact  4. Desires epidural  4.  GBS status: Negative  Plan:  1. fetal and uterine monitoring  continuously  2. Prep for epidural  3. AROM after epidural placed  4. Plan of care has been reviewed with patient and FOB  5.  Risks, benefits of treatment plan have been discussed.  6.  All questions have been answered.        Liz Jonas CNM  9/7/2023  02:54 EDT

## 2023-09-07 NOTE — ANESTHESIA PROCEDURE NOTES
Labor Epidural      Patient reassessed immediately prior to procedure    Patient location during procedure: OB  Start Time: 9/7/2023 3:51 AM  Stop Time: 9/7/2023 4:09 AM  Performed By  Anesthesiologist: Thang Vera MD  Preanesthetic Checklist  Completed: patient identified, IV checked, site marked, risks and benefits discussed, surgical consent, monitors and equipment checked, pre-op evaluation and timeout performed  Prep:  Pt Position:sitting  Sterile Tech:cap, gloves, mask and sterile barrier  Prep:DuraPrep  Monitoring:blood pressure monitoring  Epidural Block Procedure:  Approach:midline  Guidance:landmark technique  Location:L3-L4  Needle Type:Tuohy  Needle Gauge:17 G  Loss of Resistance Medium: air  Loss of Resistance: 5cm  Cath Depth at skin:15 cm  Paresthesia: none  Aspiration:negative  Test Dose:negative  Number of Attempts: 1  Post Assessment:  Dressing:occlusive dressing applied and secured with tape  Pt Tolerance:patient tolerated the procedure well with no apparent complications  Complications:no

## 2023-09-07 NOTE — PAYOR COMM NOTE
"Emi Espinoya (23 y.o. Female)     [employer] ID#[id number]    DELIVERY INFORMATION:  VAGINAL DELIVERY 9/7/23 @ 05:59  WT 3535 GMS  FEMALE  APGARS 8/9    From:Patti Chow LPN, Utilization Review  Phone #[phone]  Fax #[phone]        Date of Birth   2000    Social Security Number       Address   [address] [place] [address] [address] [id number]    Home Phone   [phone]    MRN   [id number]       [place]   Mormonism    Marital Status                               Admission Date   9/7/23    Admission Type   Elective    Admitting Provider   Inga Olvera MD    Attending Provider   Cierra Gordon MD    Department, Room/Bed   [facility] 4A, N417/1       Discharge Date       Discharge Disposition       Discharge Destination                                 Attending Provider: Cierra Gordon MD    Allergies: No Known Allergies    Isolation: None   Infection: None   Code Status: CPR    Ht: 170.2 cm (67\")   Wt: 77.1 kg (170 lb)    Admission Cmt: None   Principal Problem: None                  Active Insurance as of 9/7/2023       Primary Coverage       Payor Plan Insurance Group Employer/Plan Group    [employer] [place] [employer] [place]        Payor Plan Address Payor Plan Phone Number Payor Plan Fax Number Effective Dates    [address] [address] [phone]  12/29/2017 - None Entered    [facility] [address]         Subscriber Name Subscriber Birth Date Member ID       MAXIM ESPINO 2000 [id number]                     Emergency Contacts        (Rel.) Home Phone Work Phone Mobile Phone    Aki Espino (Spouse) [phone] -- [phone]              Insurance Information                  [facility] MEDICAID Phone: [phone]    Subscriber: NicolaanjuMaxim Subscriber#: [id number]    Group#: -- Precert#: --             History & Physical        Liz Jonas CNM at 09/07/23 0254       Attestation signed by Inga Olvera MD at " 23 4641    I have reviewed this documentation and agree.                   Len  Obstetric History and Physical    No chief complaint on file.      Subjective    Patient is a 23 y.o. female  currently at 40w4d, who presents with reports of contractions that started around 2330. No lof. Did have some spotting after she had her membranes swept in the office but no active bleeding. Reports good FM.Reactive FHR tracing. .    Her prenatal care is benign.  Her previous obstetric/gynecological history is noted for 2 previous NSVDs. Her last delivery was a precipitous delivery in the ED.     The following portions of the patients history were reviewed and updated as appropriate: current medications, allergies, past medical history, and past surgical history .       Prenatal Information:   Maternal Prenatal Labs  Blood Type No results found for: ABO   Rh Status No results found for: RH   Antibody Screen No results found for: ABSCRN   Gonnorhea No results found for: GCCX   Chlamydia No results found for: CLAMYDCU   RPR No results found for: RPR   Syphilis Antibody No results found for: SYPHILIS   Rubella No results found for: RUBELLAIGGIN   Hepatitis B Surface Antigen No results found for: HEPBSAG   HIV-1 Antibody No results found for: LABHIV1   Hepatitis C Antibody No results found for: HEPCAB   Rapid Urin Drug Screen No results found for: AMPMETHU, BARBITSCNUR, LABBENZSCN, LABMETHSCN, LABOPIASCN, THCURSCR, COCAINEUR, AMPHETSCREEN, PROPOXSCN, BUPRENORSCNU, METAMPSCNUR, OXYCODONESCN, TRICYCLICSCN   Group B Strep Culture No results found for: GBSANTIGEN           External Prenatal Results       Pregnancy Outside Results - Transcribed From Office Records - See Scanned Records For Details       Test Value Date Time    ABO  O  23 1000    Rh  Positive  23 1000    Antibody Screen  Negative  23 1000    Varicella IgG       Rubella  1.57 index 23 1000    Hgb  12.9 g/dL 23 1252        11.9 g/dL 23 1000    Hct  37.7 % 23 1252       36.7 % 23 1000    Glucose Fasting GTT       Glucose Tolerance Test 1 hour ^ 148  19     Glucose Tolerance Test 3 hour       Gonorrhea (discrete)       Chlamydia (discrete)       RPR  Non-Reactive  21 1223    VDRL       Syphilis Antibody       HBsAg  Non-Reactive  23 1000    Herpes Simplex Virus PCR       Herpes Simplex VIrus Culture       HIV  Non-Reactive  23 1000    Hep C RNA Quant PCR       Hep C Antibody  Non-Reactive  23 1000    AFP       Group B Strep ^ Negative  19     GBS Susceptibility to Clindamycin       GBS Susceptibility to Erythromycin       Fetal Fibronectin       Genetic Testing, Maternal Blood                 Drug Screening       Test Value Date Time    Urine Drug Screen ^ Negative  19     Amphetamine Screen  Negative  23 1054       Negative  23 1000    Barbiturate Screen  Negative  23 1054       Negative  23 1000    Benzodiazepine Screen  Negative  23 1054       Negative  23 1000    Methadone Screen  Negative  23 1054       Negative  23 1000    Phencyclidine Screen  Negative  23 1054       Negative  23 1000    Opiates Screen  Negative  23 1054       Positive  23 1000    THC Screen  Negative  23 1054       Negative  23 1000    Cocaine Screen       Propoxyphene Screen  Negative  23 1054       Negative  23 1000    Buprenorphine Screen  Negative  23 1054       Negative  23 1000    Methamphetamine Screen       Oxycodone Screen  Negative  23 1054       Negative  23 1000    Tricyclic Antidepressants Screen  Negative  23 1054       Negative  23 1000              Legend    ^: Historical                              Past OB History:       OB History    Para Term  AB Living   4 2 2 0 1 2   SAB IAB Ectopic Molar Multiple Live Births   0 0 0 0 0 2      # Outcome  Date GA Lbr Wilian/2nd Weight Sex Delivery Anes PTL Lv   4 Current            3 Term 04/17/22 39w3d  3185 g (7 lb 0.4 oz) M Vag-Spont None N TYLER      Name: RYNE ESPINO   2 Term 11/21/19 38w3d 13:21 / 00:15 2860 g (6 lb 4.9 oz) F Vag-Spont EPI N TYLER      Name: CHUCKY ESPINO      Apgar1: 9  Apgar5: 9   1 AB 11/24/18 7w0d    SAB         Obstetric Comments   Fob #1 - Pregnancy #1-#4      Pregnancy #3 - rapid labor - precipitous delivery       Past Medical History: Past Medical History:   Diagnosis Date    Bartholin gland cyst 2017    removed    frequent Urinary tract infection     not taking meds at this time    Pyelonephritis affecting pregnancy in second trimester 11/19/2021    UTI (urinary tract infection) during pregnancy, second trimester 9/26/2019      Past Surgical History Past Surgical History:   Procedure Laterality Date    APPENDECTOMY  2021    CYST REMOVAL      bartholin    TONSILLECTOMY  2006    WISDOM TOOTH EXTRACTION        Family History: Family History   Problem Relation Age of Onset    No Known Problems Mother     No Known Problems Father     Cancer Maternal Grandmother         Unsure what kind    No Known Problems Maternal Grandfather     No Known Problems Paternal Grandmother     No Known Problems Paternal Grandfather       Social History:  reports that she has never smoked. She has never used smokeless tobacco.   reports no history of alcohol use.   reports no history of drug use.        General ROS: Pertinent items are noted in HPI    Objective    There were no vitals filed for this visit.  Weight:       Physical Examination:   General Appearance: alert, well appearing, intermittent distress with contractions  Lungs: clear to auscultation, no wheezes, rales or rhonchi, symmetric air entry  Heart: regular rate and rhythm  Abdomen: FHT present    Extremities: no redness or tenderness in the calves or thighs  Skin: normal coloration and turgor, no rashes      Presentation: cephalic    Cervix: Exam by: Method: sterile exam per RN   Dilation: Cervical Dilation (cm): 7   Effacement: Cervical Effacement: 70%   Station: Fetal Station: -1        Fetal Heart Rate Assessment   Method:     Beats/min:     Baseline:     Varibility:     Accels:     Decels:     Tracing Category:       Uterine Assessment   Method:     Frequency (min):     Ctx Count in 10 min:     Duration:     Intensity:     Intensity by IUPC:     Resting Tone:     Resting Tone by IUPC:     [place] Units:       Laboratory Results: Pending  Radiology Review:   Other Studies:         Normal labor        Assessment:  1.  Intrauterine pregnancy at 40w4d weeks gestation with reactive fetal status.    2.  Active labor  without ROM  3. 7/70/-1/ Intact  4. Desires epidural  4.  GBS status: Negative  Plan:  1. fetal and uterine monitoring  continuously  2. Prep for epidural  3. AROM after epidural placed  4. Plan of care has been reviewed with patient and FOB  5.  Risks, benefits of treatment plan have been discussed.  6.  All questions have been answered.        iLz Jonas CNM  9/7/2023  02:54 EDT      Electronically signed by Inga Olvera MD at 09/07/23 0444       Facility-Administered Medications as of 9/7/2023   Medication Dose Route Frequency Provider Last Rate Last Admin    acetaminophen (TYLENOL) tablet 650 mg  650 mg Oral Q6H PRN Liz Jonas CNM        benzocaine (AMERICAINE) 20 % rectal ointment 1 application   1 application  Rectal PRN Liz Jonas CNM        benzocaine-menthol (DERMOPLAST) 20-0.5 % topical spray   Topical PRN Liz Jonas CNM        [START ON 9/8/2023] bisacodyl (DULCOLAX) suppository 10 mg  10 mg Rectal Daily PRN Liz Jonas CNM        docusate sodium (COLACE) capsule 100 mg  100 mg Oral BID Liz Jonas CNM        HYDROcodone-acetaminophen (NORCO) 5-325 MG per tablet 1 tablet  1 tablet Oral Q4H PRN Liz Jonas CNM        Or    HYDROcodone-acetaminophen  (NORCO)  MG per tablet 1 tablet  1 tablet Oral Q4H PRN Liz Jonas CNM        Hydrocortisone (Perianal) (ANUSOL-HC) 2.5 % rectal cream 1 application   1 application  Rectal PRN Liz Jonas CNM        ibuprofen (ADVIL,MOTRIN) tablet 600 mg  600 mg Oral Q6H PRN Liz Jonas CNM        [COMPLETED] lactated ringers bolus 1,000 mL  1,000 mL Intravenous Once PRN Liz Jonas CNM 4,000 mL/hr at 23 0300 1,000 mL at 23 0300    lactated ringers infusion  125 mL/hr Intravenous Continuous Liz Jonas CNM        lanolin topical 1 application   1 application  Topical Q1H PRN Liz Jonas CNM        magnesium hydroxide (MILK OF MAGNESIA) suspension 10 mL  10 mL Oral Daily PRN Liz Jonas CNM        ondansetron (ZOFRAN) injection 4 mg  4 mg Intravenous Q6H PRN Liz Jonas CNM        [COMPLETED] oxytocin (PITOCIN) 30 units in 0.9% sodium chloride 500 mL (premix)  999 mL/hr Intravenous Once Liz Jonas  mL/hr at 23 0600 999 mL/hr at 23 0600    Followed by    [] oxytocin (PITOCIN) 30 units in 0.9% sodium chloride 500 mL (premix)  250 mL/hr Intravenous Continuous Liz Jonas  mL/hr at 23 0712 250 mL/hr at 23 0712    Oxytocin-Sodium Chloride (PITOCIN) 30-0.9 UT/500ML-% infusion  - ADS Override Pull             ropivacaine (NAROPIN) 0.2 % injection  15 mL/hr Epidural Continuous Thang Vera MD 15 mL/hr at 23 0401 15 mL/hr at 23 0401    sodium chloride 0.9 % flush 1-10 mL  1-10 mL Intravenous PRN Liz Jonas CNM        witch hazel-glycerin (TUCKS) pad 1 pad   1 each Topical PRN Liz Jonas CNM         Orders (last 24 hrs)        Start     Ordered    23 0800  Sitz Bath  3 Times Daily        Comments: PRN    23 0854    23 0600  CBC & Differential  Timed        Comments: Postpartum Day 1      23 0854    23 0000  bisacodyl (DULCOLAX)  suppository 10 mg  Daily PRN         09/07/23 0854    09/07/23 0945  docusate sodium (COLACE) capsule 100 mg  2 Times Daily         09/07/23 0854    09/07/23 0900  sodium chloride 0.9 % flush 10 mL  Every 12 Hours Scheduled,   Status:  Discontinued         09/07/23 0244    09/07/23 0855  Code Status and Medical Interventions:  Continuous         09/07/23 0854    09/07/23 0855  Vital Signs Per hospital policy  Per Hospital Policy         09/07/23 0854    09/07/23 0855  Notify Physician  Until Discontinued         09/07/23 0854    09/07/23 0855  Up Ad Marya  Until Discontinued         09/07/23 0854    09/07/23 0855  Ambulate Patient  Every Shift       09/07/23 0854    09/07/23 0855  Advance Diet As Tolerated -Regular  Until Discontinued         09/07/23 0854    09/07/23 0855  Fundal and Lochia Check  Per Hospital Policy        Comments: Q 15 min x 4, Q 30 min x 2, then Q Shift    09/07/23 0854    09/07/23 0855  RN to Assess Rh Status & Place RhIG Evaluation Order if Indicated  Continuous         09/07/23 0854    09/07/23 0855  Bladder Assessment  Per Order Details        Comments: Postpartum 1) Upon Admission to Unit & Every 4 Hours PRN Until Voiding. 2) Out of Bed to Void in 8 Hours.    09/07/23 0854    09/07/23 0855  Straight Cath  Per Order Details        Comments: Postpartum: If Distended & Unable to Void, May Repeat Once.    09/07/23 0854    09/07/23 0855  Indwelling Urinary Catheter  Per Order Details        Comments: Postpartum : After Straight Cathed x2 or if Greater Than 1000mL Residual, Insert Indwelling Urinary Catheter Until Further MD Order.    09/07/23 0854    09/07/23 0855  Breast pump to bed  Once         09/07/23 0854    09/07/23 0855  If indicated -- Please administer RH Immunoglobulin based on results of cord blood evaluation and fetal screen lab tests, pharmacy to dispense  Per Order Details        Comments: See Process Instructions For Reference Range Details.    09/07/23 0854    09/07/23 0854   sodium chloride 0.9 % flush 1-10 mL  As Needed         09/07/23 0854    09/07/23 0854  ibuprofen (ADVIL,MOTRIN) tablet 600 mg  Every 6 Hours PRN         09/07/23 0854    09/07/23 0854  acetaminophen (TYLENOL) tablet 650 mg  Every 6 Hours PRN         09/07/23 0854    09/07/23 0854  HYDROcodone-acetaminophen (NORCO) 5-325 MG per tablet 1 tablet  Every 4 Hours PRN        See Hyperspace for full Linked Orders Report.    09/07/23 0854    09/07/23 0854  HYDROcodone-acetaminophen (NORCO)  MG per tablet 1 tablet  Every 4 Hours PRN        See Hyperspace for full Linked Orders Report.    09/07/23 0854    09/07/23 0854  magnesium hydroxide (MILK OF MAGNESIA) suspension 10 mL  Daily PRN         09/07/23 0854    09/07/23 0854  lanolin topical 1 application   Every 1 Hour PRN         09/07/23 0854    09/07/23 0854  benzocaine-menthol (DERMOPLAST) 20-0.5 % topical spray  As Needed         09/07/23 0854    09/07/23 0854  witch hazel-glycerin (TUCKS) pad 1 pad   As Needed         09/07/23 0854    09/07/23 0854  Hydrocortisone (Perianal) (ANUSOL-HC) 2.5 % rectal cream 1 application   As Needed         09/07/23 0854    09/07/23 0854  benzocaine (AMERICAINE) 20 % rectal ointment 1 application   As Needed         09/07/23 0854    09/07/23 0854  ondansetron (ZOFRAN) injection 4 mg  Every 6 Hours PRN         09/07/23 0854    09/07/23 0847  DIET MESSAGE Garden salad with ranch and fruit plate and lemonade for lunch.    Please do not call for lunch order, pt wants to rest  Once        Comments: Garden salad with ranch and fruit plate and lemonade for lunch.    Please do not call for lunch order, pt wants to rest    09/07/23 0848    09/07/23 0715  oxytocin (PITOCIN) 30 units in 0.9% sodium chloride 500 mL (premix)  Continuous        See Hyperspace for full Linked Orders Report.    09/07/23 0611 09/07/23 0708  VTE Prophylaxis Not Indicated: No Risk Factors (0); </= 3 (Low Risk)  Once         09/07/23 0708 09/07/23 0612  Notify  Physician (specified)  Until Discontinued         09/07/23 0611    09/07/23 0612  Vital Signs Per Hospital Policy  Per Hospital Policy         09/07/23 0611    09/07/23 0612  Fundal & Lochia Check  Per Order Details        Comments: Every 15 Minutes x4, Then Every 30 Minutes x2, Then Every Shift    09/07/23 0611    09/07/23 0612  Fundal & Lochia Check  Every Shift       09/07/23 0611    09/07/23 0612  Indwelling Urinary Catheter  Once,   Status:  Canceled         09/07/23 0611    09/07/23 0612  Diet: Regular/House Diet; Texture: Regular Texture (IDDSI 7); Fluid Consistency: Thin (IDDSI 0)  Diet Effective Now         09/07/23 0611    09/07/23 0612  Transfer to postpartum when discharge criteria met.  Until Discontinued         09/07/23 0611    09/07/23 0611  ibuprofen (ADVIL,MOTRIN) tablet 600 mg  Every 6 Hours PRN,   Status:  Discontinued         09/07/23 0611    09/07/23 0611  methylergonovine (METHERGINE) injection 200 mcg  Once As Needed,   Status:  Discontinued         09/07/23 0611    09/07/23 0611  carboprost (HEMABATE) injection 250 mcg  As Needed,   Status:  Discontinued         09/07/23 0611    09/07/23 0611  miSOPROStol (CYTOTEC) tablet 800 mcg  As Needed,   Status:  Discontinued         09/07/23 0611    09/07/23 0611  oxytocin (PITOCIN) 30 units in 0.9% sodium chloride 500 mL (premix)  Once        See [facility]ace for full Linked Orders Report.    09/07/23 0611    09/07/23 0430  ropivacaine (NAROPIN) 0.2 % injection  Continuous         09/07/23 0344    09/07/23 0430  Sod Citrate-Citric Acid (BICITRA) solution 30 mL  Once,   Status:  Discontinued         09/07/23 0344    09/07/23 0413  Fentanyl, Urine - Urine, Clean Catch  Once         09/07/23 0412    09/07/23 0349  Oxytocin-Sodium Chloride (PITOCIN) 30-0.9 UT/500ML-% infusion  - ADS Override Pull        Note to Pharmacy: Created by cabinet override    09/07/23 0349 09/07/23 0345  Vital Signs Per Anesthesia Guidelines  Per Order Details        Comments:  Every 3 Minutes x20 Minutes Following Epidural Dosing, Then Every 15 Minutes If Stable    09/07/23 0344 09/07/23 0345  Start IV with #16 or #18 gauge angiocath.  Once         09/07/23 0344 09/07/23 0345  Fetal Heart Rate Monitor  Once         09/07/23 0344 09/07/23 0345  Nurse or anesthesiologist to remain with patient for 15 minutes following dosing.  Until Discontinued         09/07/23 0344 09/07/23 0345  Facilitate maternal postion on side and maintain uterine displacement.  Until Discontinued         09/07/23 0344 09/07/23 0345  Consult anesthesia services prior to changing epidural infusion/rate.  Until Discontinued         09/07/23 0344 09/07/23 0345  Notify physician for the following conditions:  Until Discontinued         09/07/23 0344 09/07/23 0344  lactated ringers bolus 1,000 mL  As Needed,   Status:  Discontinued         09/07/23 0344 09/07/23 0344  ePHEDrine Sulfate (Pressors) 5 MG/ML injection 10 mg  Every 10 Minutes PRN,   Status:  Discontinued         09/07/23 0344 09/07/23 0344  metoclopramide (REGLAN) injection 10 mg  Once As Needed,   Status:  Discontinued         09/07/23 0344 09/07/23 0344  ondansetron (ZOFRAN) injection 4 mg  Once As Needed,   Status:  Discontinued         09/07/23 0344 09/07/23 0344  famotidine (PEPCID) injection 20 mg  Once As Needed,   Status:  Discontinued         09/07/23 0344 09/07/23 0344  diphenhydrAMINE (BENADRYL) injection 12.5 mg  Every 8 Hours PRN,   Status:  Discontinued         09/07/23 0344 09/07/23 0342  Urine Drug Screen - Urine, Clean Catch  Once         09/07/23 0341    09/07/23 0330  lactated ringers infusion  Continuous         09/07/23 0244    09/07/23 0330  mineral oil liquid 30 mL  Once,   Status:  Discontinued         09/07/23 0244    09/07/23 0245  Admit To Obstetrics Inpatient  Once         09/07/23 0245    09/07/23 0244  Code Status and Medical Interventions:  Continuous,   Status:  Canceled         09/07/23  02423 024  Obtain informed consent  Once         23  VTE Prophylaxis Not Indicated: No Risk Factors (0); </= 3 (Low Risk)  Once         23  Vital Signs Per Hospital Policy  Per Hospital Policy         23  Mini- prep prior to delivery  Once         23  Continuous Fetal Monitoring With NST on Admission and Prior to Initiation of Oxytocin.  Per Order Details        Comments: Continuous Fetal Monitoring With NST on Admission & Prior to Initiation of Oxytocin.    23  External Uterine Contraction Monitoring  Per Hospital Policy         23  Notify Physician (specified)  Until Discontinued         23  Notify physician for tachysystole (per hospital algorithm)  Until Discontinued         23  Notify physician if membranes ruptured, bleeding greater than 1 pad an hour, fetal heart tone abnormality, and severe pain  Until Discontinued         23  Bed Rest with Bathroom Privileges  Once         23  Initiate Group Beta Strep (GBS) Prophylaxis Protocol, If Criteria Met  Continuous        Comments: NO TREATMENT RECOMMENDED IF: 1)  Maternal GBS status known negative 2)  Scheduled  birth with intact membranes, not in labor.  3 ) Maternal GBS unknown, no risk factors.   TREAT WITH ANTIBIOTICS IF:  1)  Maternal GBS status is known postive.  2)  Maternal GBS status unknown with these risk factors:  a)  Previous infant affected by GBS infection.  b)  GBS urinary tract infection (UTI) or bacteruria during pregnancy  c)  Unexplained maternal fever in labor (greater than or equal to 100.4F or 38.0C)  d)  Prolonged rupture of the membranes greater than or equal to 18 hours.  e)  Gestational age less than 37 weeks.    23   CBC (No Diff)  Once         09/07/23 0244 09/07/23 0244  Type & Screen  Once         09/07/23 0244 09/07/23 0244  Insert Peripheral IV  Once         09/07/23 0244 09/07/23 0244  Saline Lock & Maintain IV Access  Continuous         09/07/23 0244 09/07/23 0244  Diet: Liquid Diets; Clear Liquid; Texture: Regular Texture (IDDSI 7); Fluid Consistency: Thin (IDDSI 0)  Diet Effective Now,   Status:  Canceled         09/07/23 0244 09/07/23 0243  nalbuphine (NUBAIN) injection 5 mg  Every 3 Hours PRN,   Status:  Discontinued         09/07/23 0244 09/07/23 0243  sodium chloride 0.9 % flush 10 mL  As Needed,   Status:  Discontinued         09/07/23 0244 09/07/23 0243  sodium chloride 0.9 % infusion 40 mL  As Needed,   Status:  Discontinued         09/07/23 0244 09/07/23 0243  lidocaine PF 1% (XYLOCAINE) injection 0.5 mL  Once As Needed,   Status:  Discontinued         09/07/23 0244 09/07/23 0243  lactated ringers bolus 1,000 mL  Once As Needed         09/07/23 0244 09/07/23 0243  acetaminophen (TYLENOL) tablet 650 mg  Every 4 Hours PRN,   Status:  Discontinued         09/07/23 0244 09/07/23 0243  ondansetron (ZOFRAN) tablet 4 mg  Every 6 Hours PRN,   Status:  Discontinued        See Hyperspace for full Linked Orders Report.    09/07/23 0244 09/07/23 0243  ondansetron (ZOFRAN) injection 4 mg  Every 6 Hours PRN,   Status:  Discontinued        See Hyperspace for full Linked Orders Report.    09/07/23 0244 09/07/23 0243  promethazine (PHENERGAN) suppository 12.5 mg  Every 6 Hours PRN,   Status:  Discontinued        See Hyperspace for full Linked Orders Report.    09/07/23 0244 09/07/23 0243  promethazine (PHENERGAN) tablet 12.5 mg  Every 6 Hours PRN,   Status:  Discontinued        See Hyperspace for full Linked Orders Report.    09/07/23 0244 09/07/23 0243  terbutaline (BRETHINE) injection 0.25 mg  As Needed,   Status:  Discontinued         09/07/23 0244    09/07/23 0000  Group B  Streptococcus Culture - Swab, Vaginal/Rectum        Comments: This is an external result entered through the Results Console.      23 0712    Unscheduled  Position Change - For Intra-Uterine Resusitation for Hypertonus, HyperStimulation or Non-Reassuring Fetal Status  As Needed       23 0244    Unscheduled  Up with Assistance  As Needed       23 0611    Unscheduled  Apply Ice to Perineum  As Needed       23 0611    Unscheduled  Bladder Assessment  As Needed       23 0611    Unscheduled  Apply Ice to Perineum  As Needed      Comments: For 20 min q 2 hrs    23 0854    Unscheduled  Waffle Cushion  As Needed      Comments: For perineal discomfort    23 0854    Unscheduled  Donut Ring  As Needed      Comments: For perineal pain    23 0854    Unscheduled  Kpad  As Needed      Comments: For pain    23 0854    Unscheduled  Warm compress  As Needed       23 0854    Unscheduled  Apply ace wrap, tight bra, or binder  As Needed       23 0854    Unscheduled  Apply ice packs  As Needed       23 0854                     Operative/Procedure Notes (last 24 hours)        Liz Jonas, PATO at 23 0705          [facility]  Vaginal Delivery Note    Delivery     Delivery: Vaginal, Spontaneous     YOB: 2025    Time of Birth: 5:59 AM      Anesthesia: Epidural     Delivering clinician: Liz Jonas    Forceps?   No   Vacuum? No    Shoulder dystocia present: No        Delivery narrative:  Uncomplicated  at 40w4d over an intact perineum. Anterior shoulder delivered with ease. Infant vigorous at delivery so placed on maternal abdomen. Delayed cord clamping x 2 min. Cord doubly clamped and cut. Cord blood and cord segment obtained. Placenta delivered spontaneously and appeared intact. No lacerations noted.       Infant    Findings: female  infant     Infant observations: Weight: 3535 g (7 lb 12.7 oz)   Length: 20   in  Observations/Comments:        Apgars: 8  @ 1 minute /    9  @ 5 minutes         Placenta, Cord, and Fluid    Placenta delivered  Expressed  at  9/7/2023  6:14 AM     Cord: 3 vessels  present.   Nuchal Cord?  no   Cord blood obtained: Yes    Cord gases obtained:  No    Cord gas results: Venous:  No components found for:  PHCVEN,  BECVEN     Arterial:  No components found for:  PHCART,  BECART      Repair    Episiotomy: No   Lacerations: No   Estimated Blood Loss: 100  mls.   Suture used for repair: N/a         Complications  none    Disposition  Mother to Mother Baby/Postpartum  in stable condition currently.  Baby to remains with mom  in stable condition currently.      Liz Jonas CNM  09/07/23  07:05 EDT        Electronically signed by Liz Jonas CNM at 09/07/23 0707       Physician Progress Notes (last 24 hours)  Notes from 09/06/23 0909 through 09/07/23 0909   No notes of this type exist for this encounter.

## 2023-09-07 NOTE — LACTATION NOTE
09/07/23 1430   Maternal Information   Date of Referral 09/07/23   Person Making Referral lactation consultant;nurse  (new mother/baby couplet)   Maternal Assessment   Breast Size Issue none   Breast Shape Bilateral:;round   Breast Density Bilateral:;soft   Areola firm   Nipples short;Bilateral:;everted;other (see comments)  (Nipples are not pliable/flexible.)   Left Nipple Symptoms intact;nontender   Right Nipple Symptoms intact;nontender   Maternal Infant Feeding   Maternal Emotional State receptive;relaxed;independent  (Mom  previous 2 children for 7 months to 1 year. Reports she had a very good supply of milk.)   Infant Positioning clutch/football   Signs of Milk Transfer deep jaw excursions noted  (Baby tends to tongue thrust with finger suck training.  Her tongue appears thick and low tone.)   Pain with Feeding yes   Pain Location nipple, right   Pain Description sharp  (pinching)   Comfort Measures Before/During Feeding maternal position adjusted;infant position adjusted   Nipple Shape After Feeding, Right pinched  (Mom encouraged to use nipple shield, for now to prevent compression damage to nipple.)   Latch Assistance minimal assistance   Support Person Involvement verbally supports mother   Milk Expression/Equipment   Breast Pump Type double electric, personal  (Mom said she has an older Medela pump and was given a Spectra pump through her insurance.  She also has a Haakaa pump.)     Baby has difficulty latching deeply.  She tends to tongue thrust and is pinching Mom's nipple.  Mom advised to use a nipple shield, for now.  Mom was encouraged to attempt breastfeeding every 3 hours and on demand, and to call for latch assistance, if needed. If tongue thrusting continues until tomorrow, SLP should be consulted.

## 2023-09-07 NOTE — L&D DELIVERY NOTE
[facility]  Vaginal Delivery Note    Delivery     Delivery: Vaginal, Spontaneous     YOB: 2025    Time of Birth: 5:59 AM      Anesthesia: Epidural     Delivering clinician: Liz Jonas    Forceps?   No   Vacuum? No    Shoulder dystocia present: No        Delivery narrative:  Uncomplicated  at 40w4d over an intact perineum. Anterior shoulder delivered with ease. Infant vigorous at delivery so placed on maternal abdomen. Delayed cord clamping x 2 min. Cord doubly clamped and cut. Cord blood and cord segment obtained. Placenta delivered spontaneously and appeared intact. No lacerations noted.       Infant    Findings: female  infant     Infant observations: Weight: 3535 g (7 lb 12.7 oz)   Length: 20  in  Observations/Comments:        Apgars: 8  @ 1 minute /    9  @ 5 minutes         Placenta, Cord, and Fluid    Placenta delivered  Expressed  at  2023  6:14 AM     Cord: 3 vessels  present.   Nuchal Cord?  no   Cord blood obtained: Yes    Cord gases obtained:  No    Cord gas results: Venous:  No components found for:  PHCVEN,  BECVEN     Arterial:  No components found for:  PHCART,  BECART      Repair    Episiotomy: No   Lacerations: No   Estimated Blood Loss: 100  mls.   Suture used for repair: N/a         Complications  none    Disposition  Mother to Mother Baby/Postpartum  in stable condition currently.  Baby to remains with mom  in stable condition currently.      Liz Jonas CNM  23  07:05 EDT

## 2023-09-08 LAB
BASOPHILS # BLD AUTO: 0.09 10*3/MM3 (ref 0–0.2)
BASOPHILS NFR BLD AUTO: 0.7 % (ref 0–1.5)
DEPRECATED RDW RBC AUTO: 45.1 FL (ref 37–54)
EOSINOPHIL # BLD AUTO: 0.37 10*3/MM3 (ref 0–0.4)
EOSINOPHIL NFR BLD AUTO: 2.8 % (ref 0.3–6.2)
ERYTHROCYTE [DISTWIDTH] IN BLOOD BY AUTOMATED COUNT: 12.6 % (ref 12.3–15.4)
HCT VFR BLD AUTO: 39.6 % (ref 34–46.6)
HGB BLD-MCNC: 13.4 G/DL (ref 12–15.9)
IMM GRANULOCYTES # BLD AUTO: 0.32 10*3/MM3 (ref 0–0.05)
IMM GRANULOCYTES NFR BLD AUTO: 2.4 % (ref 0–0.5)
LYMPHOCYTES # BLD AUTO: 1.76 10*3/MM3 (ref 0.7–3.1)
LYMPHOCYTES NFR BLD AUTO: 13.3 % (ref 19.6–45.3)
MCH RBC QN AUTO: 32.8 PG (ref 26.6–33)
MCHC RBC AUTO-ENTMCNC: 33.8 G/DL (ref 31.5–35.7)
MCV RBC AUTO: 97.1 FL (ref 79–97)
MONOCYTES # BLD AUTO: 0.91 10*3/MM3 (ref 0.1–0.9)
MONOCYTES NFR BLD AUTO: 6.9 % (ref 5–12)
NEUTROPHILS NFR BLD AUTO: 73.9 % (ref 42.7–76)
NEUTROPHILS NFR BLD AUTO: 9.75 10*3/MM3 (ref 1.7–7)
NRBC BLD AUTO-RTO: 0 /100 WBC (ref 0–0.2)
PLATELET # BLD AUTO: 131 10*3/MM3 (ref 140–450)
PMV BLD AUTO: 12.5 FL (ref 6–12)
RBC # BLD AUTO: 4.08 10*6/MM3 (ref 3.77–5.28)
WBC NRBC COR # BLD: 13.2 10*3/MM3 (ref 3.4–10.8)

## 2023-09-08 PROCEDURE — 85025 COMPLETE CBC W/AUTO DIFF WBC: CPT | Performed by: ADVANCED PRACTICE MIDWIFE

## 2023-09-08 RX ADMIN — ACETAMINOPHEN 650 MG: 325 TABLET ORAL at 07:50

## 2023-09-08 RX ADMIN — IBUPROFEN 600 MG: 600 TABLET ORAL at 22:55

## 2023-09-08 RX ADMIN — DOCUSATE SODIUM 100 MG: 100 CAPSULE, LIQUID FILLED ORAL at 07:50

## 2023-09-08 RX ADMIN — ACETAMINOPHEN 650 MG: 325 TABLET ORAL at 14:56

## 2023-09-08 RX ADMIN — HYDROCODONE BITARTRATE AND ACETAMINOPHEN 1 TABLET: 5; 325 TABLET ORAL at 10:38

## 2023-09-08 RX ADMIN — IBUPROFEN 600 MG: 600 TABLET ORAL at 03:53

## 2023-09-08 RX ADMIN — DOCUSATE SODIUM 100 MG: 100 CAPSULE, LIQUID FILLED ORAL at 20:28

## 2023-09-08 RX ADMIN — IBUPROFEN 600 MG: 600 TABLET ORAL at 17:39

## 2023-09-08 RX ADMIN — ACETAMINOPHEN 650 MG: 325 TABLET ORAL at 20:28

## 2023-09-08 RX ADMIN — IBUPROFEN 600 MG: 600 TABLET ORAL at 10:39

## 2023-09-08 NOTE — LACTATION NOTE
09/08/23 1132   Maternal Information   Date of Referral 09/08/23   Person Making Referral nurse   Maternal Reason for Referral latch difficulty;nipple symptoms  (mother states her nipples are getting sore. No open areas noted. Pt using Silverettes. Pt allowed me to resposition her/baby to achieve deeper latch.)   Maternal Assessment   Breast Size Issue none   Nipples short;Bilateral:;graspable   Left Nipple Symptoms intact;tender   Right Nipple Symptoms intact;tender   Maternal Infant Feeding   Maternal Emotional State anxious;receptive   Infant Positioning clutch/football  (Pt asked for help getting infant on R breast in football.)   Signs of Milk Transfer deep jaw excursions noted   Pain with Feeding no  (mother states this feeding felt a lot better as infant is deeply latched.)   Comfort Measures Before/During Feeding suction broken using finger;maternal position adjusted;latch adjusted;infant position adjusted   Latch Assistance minimal assistance

## 2023-09-08 NOTE — PROGRESS NOTES
VAGINAL DELIVERY POSTPARTUM DAY 1    9/8/2023    SUBJECTIVE   Radha feels well.  Patient describes her lochia as less than menses.  Pain is well controlled       OBJECTIVE   Temp: Temp:  [98.2 °F (36.8 °C)-99.6 °F (37.6 °C)] 98.8 °F (37.1 °C) Temp src: Oral   BP: BP: (118-123)/(58-78) 120/64        Pulse: Heart Rate:  [77-91] 77  RR: Resp:  [16-18] 16    General:  No acute distress   Abdomen: Fundus firm and beneath umbilicus   Pelvis: deferred     Lab Results   Component Value Date    WBC 17.09 (H) 09/07/2023    HGB 13.5 09/07/2023    HCT 39.2 09/07/2023    MCV 94.2 09/07/2023     09/07/2023    URICACID 4.6 06/23/2023    AST 23 06/23/2023    ALT 44 (H) 06/23/2023    HEPBSAG Non-Reactive 02/28/2023     Results from last 7 days   Lab Units 09/07/23  0259   ABO TYPING  O   RH TYPING  Positive   ANTIBODY SCREEN  Negative       ASSESSMENT  PPD# 1 after vaginal delivery    PLAN  Supportive care, discharge as clinically indicated.             Nadia Garcia CNM  11:51 EDT  September 8, 2023

## 2023-09-09 VITALS
HEIGHT: 67 IN | TEMPERATURE: 98.5 F | HEART RATE: 83 BPM | WEIGHT: 170 LBS | BODY MASS INDEX: 26.68 KG/M2 | DIASTOLIC BLOOD PRESSURE: 81 MMHG | RESPIRATION RATE: 18 BRPM | SYSTOLIC BLOOD PRESSURE: 132 MMHG

## 2023-09-09 PROBLEM — Z37.9 NORMAL LABOR: Status: RESOLVED | Noted: 2023-09-07 | Resolved: 2023-09-09

## 2023-09-09 RX ORDER — IBUPROFEN 600 MG/1
600 TABLET ORAL EVERY 6 HOURS PRN
Qty: 30 TABLET | Refills: 0 | Status: SHIPPED | OUTPATIENT
Start: 2023-09-09

## 2023-09-09 RX ORDER — PSEUDOEPHEDRINE HCL 30 MG
100 TABLET ORAL 2 TIMES DAILY
Qty: 60 CAPSULE | Refills: 0 | Status: SHIPPED | OUTPATIENT
Start: 2023-09-09

## 2023-09-09 RX ADMIN — DOCUSATE SODIUM 100 MG: 100 CAPSULE, LIQUID FILLED ORAL at 07:25

## 2023-09-09 RX ADMIN — IBUPROFEN 600 MG: 600 TABLET ORAL at 07:25

## 2023-09-09 RX ADMIN — ACETAMINOPHEN 650 MG: 325 TABLET ORAL at 04:08

## 2023-09-09 NOTE — PROGRESS NOTES
VAGINAL DELIVERY POSTPARTUM DAY 2    9/9/2023    SUBJECTIVE   Radha feels well.  Patient describes her lochia as less than menses.  Pain is well controlled       OBJECTIVE   Temp: Temp:  [98.1 °F (36.7 °C)-98.5 °F (36.9 °C)] 98.5 °F (36.9 °C) Temp src: Oral   BP: BP: (119-132)/(61-81) 132/81        Pulse: Heart Rate:  [80-83] 83  RR: Resp:  [16-18] 18    General:  No acute distress   Abdomen: Fundus firm and beneath umbilicus   Pelvis: deferred     Lab Results   Component Value Date    WBC 13.20 (H) 09/08/2023    HGB 13.4 09/08/2023    HCT 39.6 09/08/2023    MCV 97.1 (H) 09/08/2023     (L) 09/08/2023    HEPBSAG Non-Reactive 02/28/2023    AST 23 06/23/2023    URICACID 4.6 06/23/2023       ASSESSMENT  PPD# 2 after vaginal delivery    PLAN  Discharge to home  Follow up with [facility] in 6 weeks  Prescription for Motrin.  Advised no tampons, intercourse, or tub baths for 6 weeks.           Nadia Garcia CNM  09:58 EDT  September 9, 2023

## 2023-09-09 NOTE — DISCHARGE SUMMARY
VAGINAL DELIVERY DISCHARGE SUMMARY      PATIENT: Radha Villanueva        MR#:[id number]  LOCATION: [facility]  ADMISSION  DIAGNOSIS:   Spontaneous vaginal delivery     DISCHARGE DIAGNOSIS: No diagnosis found.      DATE OF ADMISSION: 9/7/2023  DATE OF DISCHARGE: 09/09/23     PROCEDURES:  Vaginal, Spontaneous     9/7/2023    5:59 AM      SERVICE: Obstetrics    HOSPITAL COURSE: Radha underwent vaginal delivery of a female infant and remained in the hospital for 2 days. During that time, Radha remained afebrile and hemodynamically stable. On the day of discharge, Radha was eating, ambulating and voiding without difficulty.      LABS:   Lab Results   Component Value Date    WBC 13.20 (H) 09/08/2023    HGB 13.4 09/08/2023    HCT 39.6 09/08/2023    MCV 97.1 (H) 09/08/2023     (L) 09/08/2023    URICACID 4.6 06/23/2023    AST 23 06/23/2023    ALT 44 (H) 06/23/2023     Results from last 7 days   Lab Units 09/07/23  0259   ABO TYPING  O   RH TYPING  Positive   ANTIBODY SCREEN  Negative       DISCHARGE MEDICATIONS     Discharge Medications        New Medications        Instructions Start Date   docusate sodium 100 MG capsule   100 mg, Oral, 2 Times Daily      ibuprofen 600 MG tablet  Commonly known as: ADVIL,MOTRIN   600 mg, Oral, Every 6 Hours PRN             Continue These Medications        Instructions Start Date   acetaminophen 500 MG tablet  Commonly known as: TYLENOL   1,000 mg, Oral, Every 6 Hours PRN             Stop These Medications      doxylamine 25 MG tablet  Commonly known as: UNISOM     fluconazole 150 MG tablet  Commonly known as: Diflucan     Prenatal 27-1 27-1 MG tablet tablet              DISCHARGE DISPOSITION: Home    DISCHARGE CONDITION: Stable    DISCHARGE DIET: Regular    ACTIVITY AT DISCHARGE: Pelvic rest    INFANT FEEDING PLANS: Breast    EDUCATION: Warning signs and symptoms given, no tub baths, nothing in the vagina for 6 weeks.     FOLLOW-UP APPOINTMENTS: Follow up with LW in 4-6  al.           Nadia Garcia CNM  09/09/23  10:05 EDT

## 2024-06-21 ENCOUNTER — OFFICE VISIT (OUTPATIENT)
Dept: FAMILY MEDICINE CLINIC | Facility: CLINIC | Age: 24
End: 2024-06-21
Payer: COMMERCIAL

## 2024-06-21 ENCOUNTER — LAB (OUTPATIENT)
Dept: LAB | Facility: HOSPITAL | Age: 24
End: 2024-06-21
Payer: COMMERCIAL

## 2024-06-21 VITALS
HEART RATE: 83 BPM | SYSTOLIC BLOOD PRESSURE: 110 MMHG | TEMPERATURE: 97.8 F | DIASTOLIC BLOOD PRESSURE: 80 MMHG | OXYGEN SATURATION: 99 % | HEIGHT: 67 IN | BODY MASS INDEX: 23.23 KG/M2 | WEIGHT: 148 LBS

## 2024-06-21 DIAGNOSIS — N39.3 STRESS INCONTINENCE: ICD-10-CM

## 2024-06-21 DIAGNOSIS — G47.00 INSOMNIA, UNSPECIFIED TYPE: ICD-10-CM

## 2024-06-21 DIAGNOSIS — G47.00 INSOMNIA, UNSPECIFIED TYPE: Primary | ICD-10-CM

## 2024-06-21 LAB
ALBUMIN SERPL-MCNC: 4.7 G/DL (ref 3.5–5.2)
ALBUMIN/GLOB SERPL: 1.6 G/DL
ALP SERPL-CCNC: 68 U/L (ref 39–117)
ALT SERPL W P-5'-P-CCNC: 15 U/L (ref 1–33)
ANION GAP SERPL CALCULATED.3IONS-SCNC: 8.3 MMOL/L (ref 5–15)
AST SERPL-CCNC: 14 U/L (ref 1–32)
BILIRUB SERPL-MCNC: 0.3 MG/DL (ref 0–1.2)
BUN SERPL-MCNC: 11 MG/DL (ref 6–20)
BUN/CREAT SERPL: 15.9 (ref 7–25)
CALCIUM SPEC-SCNC: 9.8 MG/DL (ref 8.6–10.5)
CHLORIDE SERPL-SCNC: 106 MMOL/L (ref 98–107)
CO2 SERPL-SCNC: 23.7 MMOL/L (ref 22–29)
CREAT SERPL-MCNC: 0.69 MG/DL (ref 0.57–1)
EGFRCR SERPLBLD CKD-EPI 2021: 122.9 ML/MIN/1.73
GLOBULIN UR ELPH-MCNC: 2.9 GM/DL
GLUCOSE SERPL-MCNC: 73 MG/DL (ref 65–99)
POTASSIUM SERPL-SCNC: 4.2 MMOL/L (ref 3.5–5.2)
PROT SERPL-MCNC: 7.6 G/DL (ref 6–8.5)
SODIUM SERPL-SCNC: 138 MMOL/L (ref 136–145)
TSH SERPL DL<=0.05 MIU/L-ACNC: 2.4 UIU/ML (ref 0.27–4.2)

## 2024-06-21 PROCEDURE — 84443 ASSAY THYROID STIM HORMONE: CPT

## 2024-06-21 PROCEDURE — 80053 COMPREHEN METABOLIC PANEL: CPT

## 2024-06-21 RX ORDER — DIPHENHYDRAMINE HCL 25 MG/1
CAPSULE ORAL
COMMUNITY
Start: 2024-06-18

## 2024-06-21 NOTE — PROGRESS NOTES
"    Office Note     Name: Radha Villanueva    : 2000     MRN: [id number]     Chief Complaint  Insomnia and Urinary Incontinence (While jumping, running, sneezing etc. )    Subjective     History of Present Illness:  Radha Villanueva is a 24 y.o. female who presents today for insomnia and urinary incontinence.     Patient reports that since she had her first daughter 4 years ago, she has been struggling with insomnia.  She can lay awake all night if she does not take a sleep aid.  She currently is on doxylamine.  Even after her pregnancies, she has been struggling with sleep.  She has cut out caffeine after lunch.  She avoids napping.  Her kids to wake up at night and that messes up her sleep schedule.  She cannot sleep if she does not take her sleep aid.  She would like a referral to sleep medicine for further evaluation, requests Dr. Fran Reyes.     Stress incontinence: Patient reports that she has had some dribbling when she jumps, runs.  She has not dribbling.  She would like to try simple floor exercises          Objective     Past Medical History:   Diagnosis Date    Bartholin gland cyst 2017    removed    frequent Urinary tract infection     not taking meds at this time    Pyelonephritis affecting pregnancy in second trimester 2021    UTI (urinary tract infection) during pregnancy, second trimester 2019     Past Surgical History:   Procedure Laterality Date    APPENDECTOMY      CYST REMOVAL      bartholin    TONSILLECTOMY  2006    WISDOM TOOTH EXTRACTION       Family History   Problem Relation Age of Onset    No Known Problems Mother     No Known Problems Father     Cancer Maternal Grandmother         Unsure what kind    No Known Problems Maternal Grandfather     No Known Problems Paternal Grandmother     No Known Problems Paternal Grandfather        Vital Signs  /80   Pulse 83   Temp 97.8 °F (36.6 °C) (Infrared)   Ht 170.2 cm (67\")   Wt 67.1 kg (148 lb)   SpO2 99%   " "BMI 23.18 kg/m²   Estimated body mass index is 23.18 kg/m² as calculated from the following:    Height as of this encounter: 170.2 cm (67\").    Weight as of this encounter: 67.1 kg (148 lb).    Physical Exam  Vitals reviewed.   Constitutional:       Appearance: Normal appearance.   HENT:      Head: Normocephalic.      Right Ear: External ear normal.      Left Ear: External ear normal.      Nose: Nose normal.      Mouth/Throat:      Mouth: Mucous membranes are moist.   Eyes:      Extraocular Movements: Extraocular movements intact.   Cardiovascular:      Rate and Rhythm: Normal rate and regular rhythm.      Heart sounds: Normal heart sounds.   Pulmonary:      Effort: Pulmonary effort is normal. No respiratory distress.      Breath sounds: Normal breath sounds.   Abdominal:      General: Abdomen is flat.      Palpations: Abdomen is soft.   Musculoskeletal:      Cervical back: No rigidity.      Comments: Moving all extremities spontaneously   Skin:     General: Skin is warm and dry.   Neurological:      General: No focal deficit present.      Mental Status: She is alert and oriented to person, place, and time.   Psychiatric:         Mood and Affect: Mood normal.         Behavior: Behavior normal.               Assessment and Plan     Diagnoses and all orders for this visit:    1. Insomnia, unspecified type (Primary)  Assessment & Plan:  - Patient with insomnia, started when she became pregnant and has continued to  - I do think she has trouble with her sleep schedule because she does have small children at home and the frequent nighttime awakenings are disrupting her sleep   - We discussed sleep hygiene  - Can continue the doxylamine  - Obtaining labs to rule out other potential etiologies, will follow-up on results and advise further  - Referral placed to sleep clinic, Dr. Fran Reyes, per patient request    Orders:  -     TSH Rfx On Abnormal To Free T4; Future  -     Comprehensive Metabolic Panel; Future  -  "    Ambulatory Referral to Sleep Medicine    2. Stress incontinence  Assessment & Plan:  - Patient with stress incontinence after her pregnancies  - Will refer to physical therapy for pelvic floor physical therapy    Orders:  -     Ambulatory Referral to Physical Therapy      Follow Up  No follow-ups on file.    Sofía Santiago MD

## 2024-06-21 NOTE — ASSESSMENT & PLAN NOTE
- Patient with insomnia, started when she became pregnant and has continued to  - I do think she has trouble with her sleep schedule because she does have small children at home and the frequent nighttime awakenings are disrupting her sleep   - We discussed sleep hygiene  - Can continue the doxylamine  - Obtaining labs to rule out other potential etiologies, will follow-up on results and advise further  - Referral placed to sleep clinic, Dr. Fran Reyes, per patient request

## 2024-06-21 NOTE — ASSESSMENT & PLAN NOTE
- Patient with stress incontinence after her pregnancies  - Will refer to physical therapy for pelvic floor physical therapy

## 2024-08-22 ENCOUNTER — TELEPHONE (OUTPATIENT)
Dept: FAMILY MEDICINE CLINIC | Facility: CLINIC | Age: 24
End: 2024-08-22
Payer: COMMERCIAL

## 2024-08-22 DIAGNOSIS — N39.3 STRESS INCONTINENCE: Primary | ICD-10-CM

## 2024-08-22 NOTE — TELEPHONE ENCOUNTER
Patient needs a new order for pelvic therapy she is currently attending [facility] in [facility] and they need a new order every 30 days. Please fax order to [facility] asap

## 2024-09-18 ENCOUNTER — OFFICE VISIT (OUTPATIENT)
Dept: SLEEP MEDICINE | Age: 24
End: 2024-09-18
Payer: COMMERCIAL

## 2024-09-18 VITALS
TEMPERATURE: 98.5 F | HEIGHT: 67 IN | SYSTOLIC BLOOD PRESSURE: 106 MMHG | BODY MASS INDEX: 22.76 KG/M2 | DIASTOLIC BLOOD PRESSURE: 70 MMHG | WEIGHT: 145 LBS | OXYGEN SATURATION: 99 % | HEART RATE: 82 BPM

## 2024-09-18 DIAGNOSIS — G47.19 EXCESSIVE DAYTIME SLEEPINESS: ICD-10-CM

## 2024-09-18 DIAGNOSIS — F51.04 CHRONIC INSOMNIA: Primary | ICD-10-CM

## 2024-12-26 ENCOUNTER — LAB (OUTPATIENT)
Dept: LAB | Facility: HOSPITAL | Age: 24
End: 2024-12-26
Payer: COMMERCIAL

## 2024-12-26 ENCOUNTER — TRANSCRIBE ORDERS (OUTPATIENT)
Dept: LAB | Facility: HOSPITAL | Age: 24
End: 2024-12-26
Payer: COMMERCIAL

## 2024-12-26 DIAGNOSIS — R30.0 DYSURIA: Primary | ICD-10-CM

## 2024-12-26 PROCEDURE — 87086 URINE CULTURE/COLONY COUNT: CPT | Performed by: OBSTETRICS & GYNECOLOGY

## 2024-12-27 LAB — BACTERIA SPEC AEROBE CULT: NORMAL

## 2025-02-20 ENCOUNTER — LAB (OUTPATIENT)
Dept: LAB | Facility: HOSPITAL | Age: 25
End: 2025-02-20
Payer: COMMERCIAL

## 2025-02-20 ENCOUNTER — TRANSCRIBE ORDERS (OUTPATIENT)
Dept: LAB | Facility: HOSPITAL | Age: 25
End: 2025-02-20
Payer: COMMERCIAL

## 2025-02-20 DIAGNOSIS — R30.0 DYSURIA: ICD-10-CM

## 2025-02-20 DIAGNOSIS — R30.0 DYSURIA: Primary | ICD-10-CM

## 2025-02-20 PROCEDURE — 87186 SC STD MICRODIL/AGAR DIL: CPT

## 2025-02-20 PROCEDURE — 87086 URINE CULTURE/COLONY COUNT: CPT

## 2025-02-20 PROCEDURE — 87077 CULTURE AEROBIC IDENTIFY: CPT

## 2025-02-22 LAB — BACTERIA SPEC AEROBE CULT: ABNORMAL

## 2025-03-24 ENCOUNTER — TRANSCRIBE ORDERS (OUTPATIENT)
Dept: LAB | Facility: HOSPITAL | Age: 25
End: 2025-03-24
Payer: COMMERCIAL

## 2025-03-24 ENCOUNTER — LAB (OUTPATIENT)
Dept: LAB | Facility: HOSPITAL | Age: 25
End: 2025-03-24
Payer: COMMERCIAL

## 2025-03-24 DIAGNOSIS — Z34.92 SECOND TRIMESTER PREGNANCY: ICD-10-CM

## 2025-03-24 DIAGNOSIS — Z3A.13 13 WEEKS GESTATION OF PREGNANCY: Primary | ICD-10-CM

## 2025-03-24 DIAGNOSIS — Z3A.13 13 WEEKS GESTATION OF PREGNANCY: ICD-10-CM

## 2025-03-24 LAB
ABO GROUP BLD: NORMAL
AMPHET+METHAMPHET UR QL: NEGATIVE
AMPHETAMINES UR QL: NEGATIVE
BARBITURATES UR QL SCN: NEGATIVE
BENZODIAZ UR QL SCN: NEGATIVE
BLD GP AB SCN SERPL QL: NEGATIVE
BUPRENORPHINE SERPL-MCNC: NEGATIVE NG/ML
CANNABINOIDS SERPL QL: NEGATIVE
COCAINE UR QL: NEGATIVE
DEPRECATED RDW RBC AUTO: 40.5 FL (ref 37–54)
ERYTHROCYTE [DISTWIDTH] IN BLOOD BY AUTOMATED COUNT: 12.2 % (ref 12.3–15.4)
FENTANYL UR-MCNC: NEGATIVE NG/ML
GLUCOSE SERPL-MCNC: 56 MG/DL (ref 65–99)
HBA1C MFR BLD: 4.9 % (ref 4.8–5.6)
HBV SURFACE AG SERPL QL IA: NORMAL
HCT VFR BLD AUTO: 38.6 % (ref 34–46.6)
HCV AB SER QL: NORMAL
HGB BLD-MCNC: 12.9 G/DL (ref 12–15.9)
HIV 1+2 AB+HIV1 P24 AG SERPL QL IA: NORMAL
MCH RBC QN AUTO: 30.4 PG (ref 26.6–33)
MCHC RBC AUTO-ENTMCNC: 33.4 G/DL (ref 31.5–35.7)
MCV RBC AUTO: 90.8 FL (ref 79–97)
METHADONE UR QL SCN: NEGATIVE
OPIATES UR QL: NEGATIVE
OXYCODONE UR QL SCN: NEGATIVE
PCP UR QL SCN: NEGATIVE
PLATELET # BLD AUTO: 249 10*3/MM3 (ref 140–450)
PMV BLD AUTO: 11.5 FL (ref 6–12)
RBC # BLD AUTO: 4.25 10*6/MM3 (ref 3.77–5.28)
RH BLD: POSITIVE
TREPONEMA PALLIDUM IGG+IGM AB [PRESENCE] IN SERUM OR PLASMA BY IMMUNOASSAY: NORMAL
TRICYCLICS UR QL SCN: NEGATIVE
WBC NRBC COR # BLD AUTO: 10.15 10*3/MM3 (ref 3.4–10.8)

## 2025-03-24 PROCEDURE — 80307 DRUG TEST PRSMV CHEM ANLYZR: CPT

## 2025-03-24 PROCEDURE — 36415 COLL VENOUS BLD VENIPUNCTURE: CPT

## 2025-03-24 PROCEDURE — 83036 HEMOGLOBIN GLYCOSYLATED A1C: CPT

## 2025-03-24 PROCEDURE — 87491 CHLMYD TRACH DNA AMP PROBE: CPT

## 2025-03-24 PROCEDURE — 86850 RBC ANTIBODY SCREEN: CPT

## 2025-03-24 PROCEDURE — 87086 URINE CULTURE/COLONY COUNT: CPT | Performed by: ADVANCED PRACTICE MIDWIFE

## 2025-03-24 PROCEDURE — 86762 RUBELLA ANTIBODY: CPT

## 2025-03-24 PROCEDURE — 86900 BLOOD TYPING SEROLOGIC ABO: CPT

## 2025-03-24 PROCEDURE — G0432 EIA HIV-1/HIV-2 SCREEN: HCPCS

## 2025-03-24 PROCEDURE — 82947 ASSAY GLUCOSE BLOOD QUANT: CPT

## 2025-03-24 PROCEDURE — 87591 N.GONORRHOEAE DNA AMP PROB: CPT

## 2025-03-24 PROCEDURE — 86803 HEPATITIS C AB TEST: CPT

## 2025-03-24 PROCEDURE — 86780 TREPONEMA PALLIDUM: CPT

## 2025-03-24 PROCEDURE — 86901 BLOOD TYPING SEROLOGIC RH(D): CPT

## 2025-03-24 PROCEDURE — 87340 HEPATITIS B SURFACE AG IA: CPT

## 2025-03-24 PROCEDURE — 85027 COMPLETE CBC AUTOMATED: CPT | Performed by: ADVANCED PRACTICE MIDWIFE

## 2025-03-25 LAB — RUBV IGG SERPL IA-ACNC: 1.52 INDEX

## 2025-03-26 LAB
BACTERIA SPEC AEROBE CULT: NO GROWTH
C TRACH RRNA SPEC QL NAA+PROBE: NEGATIVE
N GONORRHOEA RRNA SPEC QL NAA+PROBE: NEGATIVE

## 2025-04-08 ENCOUNTER — OFFICE VISIT (OUTPATIENT)
Dept: UROLOGY | Facility: CLINIC | Age: 25
End: 2025-04-08
Payer: COMMERCIAL

## 2025-04-08 VITALS — OXYGEN SATURATION: 97 % | DIASTOLIC BLOOD PRESSURE: 70 MMHG | HEART RATE: 85 BPM | SYSTOLIC BLOOD PRESSURE: 107 MMHG

## 2025-04-08 DIAGNOSIS — Z87.442 HISTORY OF NEPHROLITHIASIS: Primary | ICD-10-CM

## 2025-04-08 PROCEDURE — 1159F MED LIST DOCD IN RCRD: CPT | Performed by: STUDENT IN AN ORGANIZED HEALTH CARE EDUCATION/TRAINING PROGRAM

## 2025-04-08 PROCEDURE — 99204 OFFICE O/P NEW MOD 45 MIN: CPT | Performed by: STUDENT IN AN ORGANIZED HEALTH CARE EDUCATION/TRAINING PROGRAM

## 2025-04-08 PROCEDURE — 1160F RVW MEDS BY RX/DR IN RCRD: CPT | Performed by: STUDENT IN AN ORGANIZED HEALTH CARE EDUCATION/TRAINING PROGRAM

## 2025-04-08 NOTE — PROGRESS NOTES
Office Visit New Urology      Patient Name: Radha Villanueva  : 2000   MRN: [id number]     Chief Complaint:    Chief Complaint   Patient presents with    pregnancy related renal disease    history of nephrolithasis       Referring Provider: Henry Chinchilla CNM    History of Present Illness: Radha Villanueva is a 25 y.o. female who presents to Urology today for evaluation of recurrent urinary tract infection and recent nephrolithiasis.  She has 3 healthy children and is currently 15 weeks pregnant.  She is a history of pyelonephritis during a previous pregnancy and presented to [facility] in March with right-sided flank pain.  She wanted a renal ultrasound demonstrating 2 moderately sized ureteral stones on the right side.  She was treated conservatively and eventually passed her stones during this hospitalization.  These were sent off and came back positive for calcium phosphate.  She has a history of Klebsiella urinary tract infection.  She has been recently treated with antibiotics.  The patient has numerous questions and concerns regarding her recurrent UTI and kidney stone issues.  She states she is shocked to see if she has been forming stones as she drinks nothing but water and tries to stay hydrated throughout the day.  She may be taking a beef liver supplement intermittently.  Since that she passed her stones she is also complaining of some ongoing dysuria which is mild.    I do not have the renal ultrasound imaging only the report from [facility] which does not mention additional kidney stones bilaterally.  She denies any current flank pain.    Urine culture 3/24 - no growth  Urine culture  - Klebsiella   Urine culture  - Mixed bacteria     Subjective      Review of System: Review of Systems   Genitourinary:  Positive for frequency and urgency.      I have reviewed the ROS documented by my clinical staff, I have updated appropriately and I agree. Casper SPANGLER  MD Arti    Past Medical History:   Past Medical History:   Diagnosis Date    Bartholin gland cyst 2017    removed    frequent Urinary tract infection     not taking meds at this time    Pyelonephritis affecting pregnancy in second trimester 11/19/2021    UTI (urinary tract infection) during pregnancy, second trimester 9/26/2019       Past Surgical History:   Past Surgical History:   Procedure Laterality Date    APPENDECTOMY  2021    CYST REMOVAL      bartholin    TONSILLECTOMY  2006    WISDOM TOOTH EXTRACTION         Family History:   Family History   Problem Relation Age of Onset    No Known Problems Mother     No Known Problems Father     Cancer Maternal Grandmother         Unsure what kind    No Known Problems Maternal Grandfather     No Known Problems Paternal Grandmother     No Known Problems Paternal Grandfather        Social History:   Social History     Socioeconomic History    Marital status:      Spouse name: Aki    Number of children: 2   Tobacco Use    Smoking status: Never    Smokeless tobacco: Never   Vaping Use    Vaping status: Never Used   Substance and Sexual Activity    Alcohol use: No    Drug use: Never    Sexual activity: Yes     Partners: Male     Birth control/protection: None       Medications:     Current Outpatient Medications:     Cranberry 125 MG tablet, Take 3 tablets by mouth Daily., Disp: , Rfl:     D-Mannose 500 MG chewable tablet, Chew 2 tablets Daily. Almost completed, Disp: , Rfl:     magnesium, as, gluconate (MAGONATE) 500 (27 Mg) MG tablet, Take 120 mg by mouth Daily., Disp: , Rfl:     acetaminophen (TYLENOL) 500 MG tablet, Take 2 tablets by mouth Every 6 (Six) Hours As Needed for Mild Pain. (Patient not taking: Reported on 4/8/2025), Disp: , Rfl:     diphenhydrAMINE HCl, Sleep, (Unisom SleepMinis) 25 MG capsule, , Disp: , Rfl:     ibuprofen (ADVIL,MOTRIN) 600 MG tablet, Take 1 tablet by mouth Every 6 (Six) Hours As Needed for Mild Pain (Patient not taking: Reported  on 4/8/2025), Disp: 30 tablet, Rfl: 0    Prenatal MV-Min-Fe Fum-FA-DHA (PRENATAL 1 PO), Take 1 tablet/day by mouth Daily., Disp: , Rfl:     Allergies:   No Known Allergies    Bladder & Bowel Symptom Questionnaire    How often do you usually urinate during the day ?   3 - About every 1-2 hours   2.   How many timed do you urinate at night?   2 - 3 times at night   3.   What is the reason that you usually urinate?   3 - Severe urge (can delay less than 10 min)   4.   Once you get the urge to go, how long can you     comfortably delay?   2 - 10-30 min   5.   How often do you get a sudden urge that makes you rush to the bathroom?   1 - Rarely   6.   How often does a sudden urge to urinate result in you leaking urine or wetting pads?   0 - Never   7.  In your opinion, how good is your bladder control?   3 - Poor   8.  Do you have accidental bowel leakage?   no   9.  Do you have difficulty fully emptying your bladder?   no   10.  Do you experience accidental leakage when performing some physical activity such as coughing, sneezing, laughing or exercise?   yes   11. Have you tried medications to help improve your symptoms?   no   12. Would you be interested in learning about a long-lasting option that may help you with your symptoms?   no                                                                             Total Score   14     0-7 (Mild) 8-16 (Moderate) 17-28 (Severe)          Objective     Physical Exam:   Vital Signs:   Vitals:    04/08/25 1035   BP: 107/70   Pulse: 85   SpO2: 97%     There is no height or weight on file to calculate BMI.     Physical Exam  Constitutional:       Appearance: Normal appearance.   HENT:      Head: Normocephalic and atraumatic.      Nose: Nose normal.      Mouth/Throat:      Mouth: Mucous membranes are moist.      Pharynx: Oropharynx is clear.   Eyes:      Extraocular Movements: Extraocular movements intact.      Pupils: Pupils are equal, round, and reactive to light.   Pulmonary:      " Effort: Pulmonary effort is normal. No respiratory distress.   Musculoskeletal:         General: No swelling or deformity. Normal range of motion.      Cervical back: Normal range of motion and neck supple.   Skin:     General: Skin is warm and dry.   Neurological:      General: No focal deficit present.      Mental Status: She is alert and oriented to person, place, and time. Mental status is at baseline.   Psychiatric:         Mood and Affect: Mood normal.         Behavior: Behavior normal.         Labs:   Brief Urine Lab Results       None            Urine Culture          12/26/2024    13:44 2/20/2025    09:40 3/24/2025    10:22   Urine Culture   Urine Culture 50,000 CFU/mL Mixed Nohelia Isolated  >100,000 CFU/mL Klebsiella aerogenes  No growth         Lab Results   Component Value Date    GLUCOSE 56 (L) 03/24/2025    CALCIUM 9.8 06/21/2024     06/21/2024    K 4.2 06/21/2024    CO2 23.7 06/21/2024     06/21/2024    BUN 11 06/21/2024    CREATININE 0.69 06/21/2024    EGFRIFNONA 116 11/25/2018    BCR 15.9 06/21/2024    ANIONGAP 8.3 06/21/2024       Lab Results   Component Value Date    WBC 10.15 03/24/2025    HGB 12.9 03/24/2025    HCT 38.6 03/24/2025    MCV 90.8 03/24/2025     03/24/2025       Images:   US Ob Transvaginal  Result Date: 3/17/2025  1. Living intrauterine pregnancy at approximately 12 weeks, 0 days. 2. The heart rate is 159 beats per minute. 3. There is no subchorionic hemorrhage or free fluid. 4. Adnexal structures appear unremarkable. 5. There is a dilated right ureter with probable two distal right ureteral stones measuring 6 and 9 mm each. Images reviewed, interpreted, dictated and electronically signed by Steven Shah MD Voice transcription technology ([employer]) is used for the dictation of this note and \"sound-alike\" words might be erroneously placed despite reviewing this note for accuracy. Errors in dictation may reflect use of voice recognition software and not all " errors in transcription may have been detected prior to signing.    US renal limited  Result Date: 3/17/2025  Mild right hydronephrosis. Images reviewed, interpreted, and dictated by Dr. BELEM Cohen. Transcribed by Araceli Maxwell PA-C.      Measures:   Tobacco:   Radha Villanueva  reports that she has never smoked. She has never used smokeless tobacco.      Assessment / Plan      Assessment/Plan:   Radha Villanueva is a 25 y.o. female who presented today for recurrent urinary tract infection, nephrolithiasis.  Discussed that pregnancy may increase risk of kidney stones in some patients, there is an intrinsic hydronephrosis likely delayed right renal drainage due to compression of the right ureter with a gravid uterus, hormonal changes can also increase risk of pregnant patients.  She does have a significant family history of nephrolithiasis.  We will likely plan for 24-hour urine to further assess her risk factors once she delivers her child.      Given risks of medication on the fetus we will avoid any preventative antibiotics for urinary tract infections at this time.  Discussed we will need to evaluate whether her urinary tract infections are patterned around the timing of intercourse which may benefit from postcoital antibiotic.  She is not sure if this is the case.  Willl send a Guidance PCR urine assessment today to ensure urine is clear.     She is wondering if her urinary tract infections have led to stones or if the stones have led to urinary tract infections.  Either could be the case.  We discussed complex physiology of calcium phosphate stone development.  Recommend she increase her water to greater than 2 L, reduce sodium, reduce protein intake, avoid supplements other than prenatal vitamins, consider citrus juices for citrate supplementation to prevent stones.  She is wondering if she has additional kidney stones, offered her a repeat renal ultrasound to assess for kidney stones which she  appreciates.  She will see me back in a few weeks to review.    Prolonged visit with patient today. Extensive education performed.     Diagnoses and all orders for this visit:    1. History of nephrolithiasis (Primary)  -     US Renal Bilateral; Future            Follow Up:   Return in about 2 weeks (around 4/22/2025) for Follow Up after Imaging.    I spent approximately 45 minutes providing clinical care for this patient; including review of patient's chart and provider documentation, face to face time spent with patient in examination room (obtaining history, performing physical exam, discussing diagnosis and management options), placing orders, and completing patient documentation.     Casper Chi MD  [facility]

## 2025-04-18 ENCOUNTER — TELEPHONE (OUTPATIENT)
Age: 25
End: 2025-04-18
Payer: COMMERCIAL
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
## 2025-05-24 ENCOUNTER — HOSPITAL ENCOUNTER (OUTPATIENT)
Dept: ULTRASOUND IMAGING | Facility: HOSPITAL | Age: 25
Discharge: HOME | End: 2025-05-24
Payer: COMMERCIAL

## 2025-05-24 DIAGNOSIS — Z87.442 HISTORY OF NEPHROLITHIASIS: ICD-10-CM

## 2025-05-24 PROCEDURE — 76775 US EXAM ABDO BACK WALL LIM: CPT

## 2025-05-30 ENCOUNTER — TELEPHONE (OUTPATIENT)
Age: 25
End: 2025-05-30
Payer: COMMERCIAL
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
## 2025-07-16 ENCOUNTER — LAB (OUTPATIENT)
Dept: LAB | Facility: HOSPITAL | Age: 25
End: 2025-07-16
Payer: COMMERCIAL

## 2025-07-16 ENCOUNTER — TRANSCRIBE ORDERS (OUTPATIENT)
Dept: LAB | Facility: HOSPITAL | Age: 25
End: 2025-07-16
Payer: COMMERCIAL

## 2025-07-16 DIAGNOSIS — Z34.92 SECOND TRIMESTER PREGNANCY: ICD-10-CM

## 2025-07-16 DIAGNOSIS — Z3A.26 26 WEEKS GESTATION OF PREGNANCY: Primary | ICD-10-CM

## 2025-07-16 DIAGNOSIS — Z3A.26 26 WEEKS GESTATION OF PREGNANCY: ICD-10-CM

## 2025-07-16 LAB
BLD GP AB SCN SERPL QL: NEGATIVE
DEPRECATED RDW RBC AUTO: 44.4 FL (ref 37–54)
ERYTHROCYTE [DISTWIDTH] IN BLOOD BY AUTOMATED COUNT: 12.5 % (ref 12.3–15.4)
GLUCOSE 1H P 100 G GLC PO SERPL-MCNC: 110 MG/DL (ref 65–139)
HCT VFR BLD AUTO: 38.3 % (ref 34–46.6)
HGB BLD-MCNC: 12.5 G/DL (ref 12–15.9)
MCH RBC QN AUTO: 31.4 PG (ref 26.6–33)
MCHC RBC AUTO-ENTMCNC: 32.6 G/DL (ref 31.5–35.7)
MCV RBC AUTO: 96.2 FL (ref 79–97)
PLATELET # BLD AUTO: 144 10*3/MM3 (ref 140–450)
PMV BLD AUTO: 11.9 FL (ref 6–12)
RBC # BLD AUTO: 3.98 10*6/MM3 (ref 3.77–5.28)
TREPONEMA PALLIDUM IGG+IGM AB [PRESENCE] IN SERUM OR PLASMA BY IMMUNOASSAY: NORMAL
WBC NRBC COR # BLD AUTO: 11.76 10*3/MM3 (ref 3.4–10.8)

## 2025-07-16 PROCEDURE — 82948 REAGENT STRIP/BLOOD GLUCOSE: CPT | Performed by: ADVANCED PRACTICE MIDWIFE

## 2025-07-16 PROCEDURE — 36415 COLL VENOUS BLD VENIPUNCTURE: CPT

## 2025-07-16 PROCEDURE — 86780 TREPONEMA PALLIDUM: CPT

## 2025-07-16 PROCEDURE — 85027 COMPLETE CBC AUTOMATED: CPT | Performed by: ADVANCED PRACTICE MIDWIFE

## 2025-07-16 PROCEDURE — 82950 GLUCOSE TEST: CPT

## 2025-07-16 PROCEDURE — 86850 RBC ANTIBODY SCREEN: CPT
